# Patient Record
Sex: FEMALE | Race: BLACK OR AFRICAN AMERICAN | NOT HISPANIC OR LATINO | Employment: FULL TIME | URBAN - METROPOLITAN AREA
[De-identification: names, ages, dates, MRNs, and addresses within clinical notes are randomized per-mention and may not be internally consistent; named-entity substitution may affect disease eponyms.]

---

## 2019-09-09 ENCOUNTER — APPOINTMENT (EMERGENCY)
Dept: CT IMAGING | Facility: HOSPITAL | Age: 34
End: 2019-09-09

## 2019-09-09 ENCOUNTER — HOSPITAL ENCOUNTER (EMERGENCY)
Facility: HOSPITAL | Age: 34
Discharge: HOME | End: 2019-09-09
Admitting: EMERGENCY MEDICINE

## 2019-09-09 VITALS
WEIGHT: 156 LBS | BODY MASS INDEX: 31.45 KG/M2 | HEART RATE: 84 BPM | SYSTOLIC BLOOD PRESSURE: 98 MMHG | RESPIRATION RATE: 15 BRPM | OXYGEN SATURATION: 100 % | TEMPERATURE: 98.5 F | DIASTOLIC BLOOD PRESSURE: 61 MMHG | HEIGHT: 59 IN

## 2019-09-09 DIAGNOSIS — G43.909 MIGRAINE: Primary | ICD-10-CM

## 2019-09-09 LAB
ANION GAP SERPL CALCULATED.3IONS-SCNC: 11 MMOL/L (ref 4–13)
BASOPHILS # BLD AUTO: 0.06 THOUSANDS/ΜL (ref 0–0.1)
BASOPHILS NFR BLD AUTO: 1 % (ref 0–1)
BUN SERPL-MCNC: 10 MG/DL (ref 5–25)
CALCIUM SERPL-MCNC: 9 MG/DL (ref 8.3–10.1)
CHLORIDE SERPL-SCNC: 102 MMOL/L (ref 100–108)
CO2 SERPL-SCNC: 24 MMOL/L (ref 21–32)
CREAT SERPL-MCNC: 0.85 MG/DL (ref 0.6–1.3)
EOSINOPHIL # BLD AUTO: 0.12 THOUSAND/ΜL (ref 0–0.61)
EOSINOPHIL NFR BLD AUTO: 2 % (ref 0–6)
ERYTHROCYTE [DISTWIDTH] IN BLOOD BY AUTOMATED COUNT: 14.5 % (ref 11.6–15.1)
GFR SERPL CREATININE-BSD FRML MDRD: 102 ML/MIN/1.73SQ M
GLUCOSE SERPL-MCNC: 112 MG/DL (ref 65–140)
HCT VFR BLD AUTO: 38.3 % (ref 34.8–46.1)
HGB BLD-MCNC: 12.7 G/DL (ref 11.5–15.4)
IMM GRANULOCYTES # BLD AUTO: 0.05 THOUSAND/UL (ref 0–0.2)
IMM GRANULOCYTES NFR BLD AUTO: 1 % (ref 0–2)
LYMPHOCYTES # BLD AUTO: 2.09 THOUSANDS/ΜL (ref 0.6–4.47)
LYMPHOCYTES NFR BLD AUTO: 30 % (ref 14–44)
MCH RBC QN AUTO: 30.6 PG (ref 26.8–34.3)
MCHC RBC AUTO-ENTMCNC: 33.2 G/DL (ref 31.4–37.4)
MCV RBC AUTO: 92 FL (ref 82–98)
MONOCYTES # BLD AUTO: 0.64 THOUSAND/ΜL (ref 0.17–1.22)
MONOCYTES NFR BLD AUTO: 9 % (ref 4–12)
NEUTROPHILS # BLD AUTO: 4.02 THOUSANDS/ΜL (ref 1.85–7.62)
NEUTS SEG NFR BLD AUTO: 58 % (ref 43–75)
NRBC BLD AUTO-RTO: 0 /100 WBCS
PLATELET # BLD AUTO: 344 THOUSANDS/UL (ref 149–390)
PMV BLD AUTO: 8.3 FL (ref 8.9–12.7)
POTASSIUM SERPL-SCNC: 4.2 MMOL/L (ref 3.5–5.3)
RBC # BLD AUTO: 4.15 MILLION/UL (ref 3.81–5.12)
SODIUM SERPL-SCNC: 137 MMOL/L (ref 136–145)
WBC # BLD AUTO: 6.98 THOUSAND/UL (ref 4.31–10.16)

## 2019-09-09 PROCEDURE — 99284 EMERGENCY DEPT VISIT MOD MDM: CPT

## 2019-09-09 PROCEDURE — 96365 THER/PROPH/DIAG IV INF INIT: CPT

## 2019-09-09 PROCEDURE — 70450 CT HEAD/BRAIN W/O DYE: CPT

## 2019-09-09 PROCEDURE — 85025 COMPLETE CBC W/AUTO DIFF WBC: CPT | Performed by: NURSE PRACTITIONER

## 2019-09-09 PROCEDURE — 99284 EMERGENCY DEPT VISIT MOD MDM: CPT | Performed by: NURSE PRACTITIONER

## 2019-09-09 PROCEDURE — 36415 COLL VENOUS BLD VENIPUNCTURE: CPT | Performed by: NURSE PRACTITIONER

## 2019-09-09 PROCEDURE — 96375 TX/PRO/DX INJ NEW DRUG ADDON: CPT

## 2019-09-09 PROCEDURE — 80048 BASIC METABOLIC PNL TOTAL CA: CPT | Performed by: NURSE PRACTITIONER

## 2019-09-09 RX ORDER — METOCLOPRAMIDE HYDROCHLORIDE 5 MG/ML
10 INJECTION INTRAMUSCULAR; INTRAVENOUS ONCE
Status: COMPLETED | OUTPATIENT
Start: 2019-09-09 | End: 2019-09-09

## 2019-09-09 RX ORDER — MEDROXYPROGESTERONE ACETATE 150 MG/ML
150 INJECTION, SUSPENSION INTRAMUSCULAR
COMMUNITY
Start: 2019-07-09

## 2019-09-09 RX ORDER — MAGNESIUM SULFATE HEPTAHYDRATE 40 MG/ML
2 INJECTION, SOLUTION INTRAVENOUS ONCE
Status: COMPLETED | OUTPATIENT
Start: 2019-09-09 | End: 2019-09-09

## 2019-09-09 RX ORDER — KETOROLAC TROMETHAMINE 30 MG/ML
30 INJECTION, SOLUTION INTRAMUSCULAR; INTRAVENOUS ONCE
Status: COMPLETED | OUTPATIENT
Start: 2019-09-09 | End: 2019-09-09

## 2019-09-09 RX ORDER — DIPHENHYDRAMINE HYDROCHLORIDE 50 MG/ML
25 INJECTION INTRAMUSCULAR; INTRAVENOUS ONCE
Status: COMPLETED | OUTPATIENT
Start: 2019-09-09 | End: 2019-09-09

## 2019-09-09 RX ADMIN — KETOROLAC TROMETHAMINE 30 MG: 30 INJECTION, SOLUTION INTRAMUSCULAR at 08:23

## 2019-09-09 RX ADMIN — DIPHENHYDRAMINE HYDROCHLORIDE 25 MG: 50 INJECTION, SOLUTION INTRAMUSCULAR; INTRAVENOUS at 08:21

## 2019-09-09 RX ADMIN — SODIUM CHLORIDE 1000 ML: 0.9 INJECTION, SOLUTION INTRAVENOUS at 08:21

## 2019-09-09 RX ADMIN — METOCLOPRAMIDE 10 MG: 5 INJECTION, SOLUTION INTRAMUSCULAR; INTRAVENOUS at 08:24

## 2019-09-09 RX ADMIN — MAGNESIUM SULFATE HEPTAHYDRATE 2 G: 40 INJECTION, SOLUTION INTRAVENOUS at 08:25

## 2019-09-09 NOTE — ED PROVIDER NOTES
History  Chief Complaint   Patient presents with    Migraine     pt c/o migraines for the past month  states that since  she noticed some loss of vision on her right side     This is a 38-year-old female who presents here with a chief complaint of migraine headache  She reports she has had daily headaches for at least the last month  She reports that occasionally when she gets these headaches she gets some right-sided numbness  She believes that her peripheral vision has been affected  The example that she provided is she can see out of her peripheral vision when someone is approaching her and passes her  But then later she will notice that they are in front of her, and she did not even see them walk by  She reports that she has been having at least daily headaches  Her headaches are typically left-sided and are associated with some photophobia  She denies nausea vomiting  Previously in the past that he had been prescribed Imitrex but had never used it  She was placed on medication for anxiety during that time as well and the headaches seem to resolve on her own  She reports that her headache is currently added level 8  Prior to Admission Medications   Prescriptions Last Dose Informant Patient Reported? Taking? medroxyPROGESTERone (DEPO-PROVERA) 150 mg/mL injection   Yes Yes   Sig: Inject 150 mg into a muscle      Facility-Administered Medications: None       Past Medical History:   Diagnosis Date    Hyperlipidemia        Past Surgical History:   Procedure Laterality Date     SECTION         History reviewed  No pertinent family history  I have reviewed and agree with the history as documented  Social History     Tobacco Use    Smoking status: Never Smoker    Smokeless tobacco: Never Used   Substance Use Topics    Alcohol use: Not Currently    Drug use: Never        Review of Systems   Constitutional: Negative for activity change, fatigue and fever     HENT: Negative for congestion, ear pain, rhinorrhea and sore throat  Eyes: Positive for visual disturbance  Respiratory: Negative for cough, shortness of breath and wheezing  Gastrointestinal: Negative for abdominal pain, diarrhea, nausea and vomiting  Endocrine: Negative  Genitourinary: Negative for difficulty urinating, dyspareunia, dysuria, flank pain, frequency, menstrual problem, pelvic pain, urgency, vaginal bleeding, vaginal discharge and vaginal pain  Musculoskeletal: Negative for arthralgias and myalgias  Skin: Negative for color change and pallor  Neurological: Positive for numbness and headaches  Negative for dizziness, speech difficulty and weakness  Hematological: Negative for adenopathy  Psychiatric/Behavioral: Negative for confusion  Physical Exam  Physical Exam   Constitutional: She is oriented to person, place, and time  She appears well-developed and well-nourished  She is cooperative  Non-toxic appearance  She does not have a sickly appearance  She does not appear ill  No distress  HENT:   Head: Normocephalic and atraumatic  Right Ear: Tympanic membrane and external ear normal    Left Ear: Tympanic membrane and external ear normal    Nose: No rhinorrhea, sinus tenderness or nasal deformity  No epistaxis  Right sinus exhibits no maxillary sinus tenderness and no frontal sinus tenderness  Left sinus exhibits no maxillary sinus tenderness and no frontal sinus tenderness  Mouth/Throat: Oropharynx is clear and moist and mucous membranes are normal  Normal dentition  Eyes: Pupils are equal, round, and reactive to light  EOM are normal    Neck: Normal range of motion  Neck supple  Cardiovascular: Normal rate, regular rhythm and normal heart sounds  No murmur heard  Pulmonary/Chest: Effort normal and breath sounds normal  No accessory muscle usage  No respiratory distress  She has no wheezes  She has no rales  She exhibits no tenderness  Abdominal: Soft   She exhibits no distension  There is no guarding  Musculoskeletal: Normal range of motion  She exhibits no edema or tenderness  Lymphadenopathy:     She has no cervical adenopathy  Neurological: She is alert and oriented to person, place, and time  She exhibits normal muscle tone  Skin: Skin is warm and dry  No rash noted  No erythema  Psychiatric: She has a normal mood and affect  Nursing note and vitals reviewed        Vital Signs  ED Triage Vitals [09/09/19 0749]   Temperature Pulse Respirations Blood Pressure SpO2   98 5 °F (36 9 °C) 88 17 134/92 99 %      Temp Source Heart Rate Source Patient Position - Orthostatic VS BP Location FiO2 (%)   Oral Monitor Sitting Right arm --      Pain Score       8           Vitals:    09/09/19 0749 09/09/19 0854 09/09/19 0944   BP: 134/92 107/63 98/61   Pulse: 88 76 84   Patient Position - Orthostatic VS: Sitting Lying Lying         Visual Acuity  Visual Acuity      Most Recent Value   L Pupil Size (mm)  5   R Pupil Size (mm)  5          ED Medications  Medications   sodium chloride 0 9 % bolus 1,000 mL (0 mL Intravenous Stopped 9/9/19 0951)   diphenhydrAMINE (BENADRYL) injection 25 mg (25 mg Intravenous Given 9/9/19 0821)   metoclopramide (REGLAN) injection 10 mg (10 mg Intravenous Given 9/9/19 0824)   ketorolac (TORADOL) injection 30 mg (30 mg Intravenous Given 9/9/19 0823)   magnesium sulfate 2 g/50 mL IVPB (premix) 2 g (0 g Intravenous Stopped 9/9/19 0951)       Diagnostic Studies  Results Reviewed     Procedure Component Value Units Date/Time    CBC and differential [[id number]]  (Abnormal) Collected:  09/09/19 0820    Lab Status:  Final result Specimen:  Blood from Arm, Right Updated:  09/09/19 0903     WBC 6 98 Thousand/uL      RBC 4 15 Million/uL      Hemoglobin 12 7 g/dL      Hematocrit 38 3 %      MCV 92 fL      MCH 30 6 pg      MCHC 33 2 g/dL      RDW 14 5 %      MPV 8 3 fL      Platelets 181 Thousands/uL      nRBC 0 /100 WBCs      Neutrophils Relative 58 %      Immat GRANS % 1 %      Lymphocytes Relative 30 %      Monocytes Relative 9 %      Eosinophils Relative 2 %      Basophils Relative 1 %      Neutrophils Absolute 4 02 Thousands/µL      Immature Grans Absolute 0 05 Thousand/uL      Lymphocytes Absolute 2 09 Thousands/µL      Monocytes Absolute 0 64 Thousand/µL      Eosinophils Absolute 0 12 Thousand/µL      Basophils Absolute 0 06 Thousands/µL     Basic metabolic panel [[id number]] Collected:  09/09/19 0820    Lab Status:  Final result Specimen:  Blood from Arm, Right Updated:  09/09/19 0099     Sodium 137 mmol/L      Potassium 4 2 mmol/L      Chloride 102 mmol/L      CO2 24 mmol/L      ANION GAP 11 mmol/L      BUN 10 mg/dL      Creatinine 0 85 mg/dL      Glucose 112 mg/dL      Calcium 9 0 mg/dL      eGFR 102 ml/min/1 73sq m     Narrative:       [place] guidelines for Chronic Kidney Disease (CKD):     Stage 1 with normal or high GFR (GFR > 90 mL/min/1 73 square meters)    Stage 2 Mild CKD (GFR = 60-89 mL/min/1 73 square meters)    Stage 3A Moderate CKD (GFR = 45-59 mL/min/1 73 square meters)    Stage 3B Moderate CKD (GFR = 30-44 mL/min/1 73 square meters)    Stage 4 Severe CKD (GFR = 15-29 mL/min/1 73 square meters)    Stage 5 End Stage CKD (GFR <15 mL/min/1 73 square meters)  Note: GFR calculation is accurate only with a steady state creatinine                 CT head without contrast   Final Result by Amy Castillo MD (09/09 0429)      No acute intracranial abnormality                    Workstation performed: [id number]                    Procedures  Procedures       ED Course                               MDM  Number of Diagnoses or Management Options  Migraine: new and requires workup     Amount and/or Complexity of Data Reviewed  Clinical lab tests: reviewed and ordered  Tests in the radiology section of CPT®: reviewed and ordered  Independent visualization of images, tracings, or specimens: yes    Patient Progress  Patient progress: stable      Disposition  Final diagnoses:   Migraine     Time reflects when diagnosis was documented in both MDM as applicable and the Disposition within this note     Time User Action Codes Description Comment    9/9/2019  9:32 AM Art Hugo [[id number]] Migraine       ED Disposition     ED Disposition Condition Date/Time Comment    Discharge Stable Mon Sep 9, 2019  9:32 AM Desi Davenport discharge to home/self care  Follow-up Information     Follow up With Specialties Details Why Contact Info Additional [address] [facility] Schedule an appointment as soon as possible for a visit  For Continued Evaluation [facility] [place] [facility] Schedule an appointment as soon as possible for a visit  For Continued Evaluation [facility] [id number]  [address]  Neurology [address], [address]          There are no discharge medications for this patient          ED Provider  Electronically Signed by           MIRNA Lewis  09/09/19 5503

## 2020-01-16 ENCOUNTER — APPOINTMENT (EMERGENCY)
Dept: RADIOLOGY | Facility: HOSPITAL | Age: 35
End: 2020-01-16
Payer: COMMERCIAL

## 2020-01-16 ENCOUNTER — APPOINTMENT (EMERGENCY)
Dept: CT IMAGING | Facility: HOSPITAL | Age: 35
End: 2020-01-16
Payer: COMMERCIAL

## 2020-01-16 ENCOUNTER — APPOINTMENT (EMERGENCY)
Dept: ULTRASOUND IMAGING | Facility: HOSPITAL | Age: 35
End: 2020-01-16
Payer: COMMERCIAL

## 2020-01-16 ENCOUNTER — HOSPITAL ENCOUNTER (EMERGENCY)
Facility: HOSPITAL | Age: 35
Discharge: HOME | End: 2020-01-16
Attending: EMERGENCY MEDICINE
Payer: COMMERCIAL

## 2020-01-16 VITALS
OXYGEN SATURATION: 98 % | DIASTOLIC BLOOD PRESSURE: 68 MMHG | HEART RATE: 83 BPM | HEIGHT: 59 IN | WEIGHT: 152.56 LBS | BODY MASS INDEX: 30.76 KG/M2 | SYSTOLIC BLOOD PRESSURE: 101 MMHG | RESPIRATION RATE: 20 BRPM | TEMPERATURE: 98.5 F

## 2020-01-16 DIAGNOSIS — R06.02 SHORTNESS OF BREATH ON EXERTION: Primary | ICD-10-CM

## 2020-01-16 LAB
ANION GAP SERPL CALCULATED.3IONS-SCNC: 11 MMOL/L (ref 4–13)
APTT PPP: 32 SECONDS (ref 23–37)
ATRIAL RATE: 86 BPM
BASOPHILS # BLD AUTO: 0.03 THOUSANDS/ΜL (ref 0–0.1)
BASOPHILS NFR BLD AUTO: 1 % (ref 0–1)
BUN SERPL-MCNC: 11 MG/DL (ref 5–25)
CALCIUM SERPL-MCNC: 8.3 MG/DL (ref 8.3–10.1)
CHLORIDE SERPL-SCNC: 104 MMOL/L (ref 100–108)
CO2 SERPL-SCNC: 24 MMOL/L (ref 21–32)
CREAT SERPL-MCNC: 0.84 MG/DL (ref 0.6–1.3)
D DIMER PPP FEU-MCNC: 0.66 UG/ML FEU
EOSINOPHIL # BLD AUTO: 0.03 THOUSAND/ΜL (ref 0–0.61)
EOSINOPHIL NFR BLD AUTO: 1 % (ref 0–6)
ERYTHROCYTE [DISTWIDTH] IN BLOOD BY AUTOMATED COUNT: 13.9 % (ref 11.6–15.1)
GFR SERPL CREATININE-BSD FRML MDRD: 103 ML/MIN/1.73SQ M
GLUCOSE SERPL-MCNC: 104 MG/DL (ref 65–140)
HCT VFR BLD AUTO: 40.1 % (ref 34.8–46.1)
HGB BLD-MCNC: 13.1 G/DL (ref 11.5–15.4)
IMM GRANULOCYTES # BLD AUTO: 0.02 THOUSAND/UL (ref 0–0.2)
IMM GRANULOCYTES NFR BLD AUTO: 0 % (ref 0–2)
INR PPP: 1.16 (ref 0.84–1.19)
LYMPHOCYTES # BLD AUTO: 0.99 THOUSANDS/ΜL (ref 0.6–4.47)
LYMPHOCYTES NFR BLD AUTO: 22 % (ref 14–44)
MCH RBC QN AUTO: 30.3 PG (ref 26.8–34.3)
MCHC RBC AUTO-ENTMCNC: 32.7 G/DL (ref 31.4–37.4)
MCV RBC AUTO: 93 FL (ref 82–98)
MONOCYTES # BLD AUTO: 0.51 THOUSAND/ΜL (ref 0.17–1.22)
MONOCYTES NFR BLD AUTO: 11 % (ref 4–12)
NEUTROPHILS # BLD AUTO: 3.03 THOUSANDS/ΜL (ref 1.85–7.62)
NEUTS SEG NFR BLD AUTO: 65 % (ref 43–75)
NRBC BLD AUTO-RTO: 0 /100 WBCS
NT-PROBNP SERPL-MCNC: 8 PG/ML
P AXIS: 63 DEGREES
PLATELET # BLD AUTO: 333 THOUSANDS/UL (ref 149–390)
PMV BLD AUTO: 8.3 FL (ref 8.9–12.7)
POTASSIUM SERPL-SCNC: 3.4 MMOL/L (ref 3.5–5.3)
PR INTERVAL: 128 MS
PROTHROMBIN TIME: 14.9 SECONDS (ref 11.6–14.5)
QRS AXIS: 87 DEGREES
QRSD INTERVAL: 76 MS
QT INTERVAL: 368 MS
QTC INTERVAL: 440 MS
RBC # BLD AUTO: 4.32 MILLION/UL (ref 3.81–5.12)
SODIUM SERPL-SCNC: 139 MMOL/L (ref 136–145)
T WAVE AXIS: 38 DEGREES
TROPONIN I SERPL-MCNC: <0.02 NG/ML
VENTRICULAR RATE: 86 BPM
WBC # BLD AUTO: 4.61 THOUSAND/UL (ref 4.31–10.16)

## 2020-01-16 PROCEDURE — 99285 EMERGENCY DEPT VISIT HI MDM: CPT

## 2020-01-16 PROCEDURE — 85025 COMPLETE CBC W/AUTO DIFF WBC: CPT | Performed by: EMERGENCY MEDICINE

## 2020-01-16 PROCEDURE — 85379 FIBRIN DEGRADATION QUANT: CPT | Performed by: EMERGENCY MEDICINE

## 2020-01-16 PROCEDURE — 84484 ASSAY OF TROPONIN QUANT: CPT | Performed by: EMERGENCY MEDICINE

## 2020-01-16 PROCEDURE — 93005 ELECTROCARDIOGRAM TRACING: CPT

## 2020-01-16 PROCEDURE — 99285 EMERGENCY DEPT VISIT HI MDM: CPT | Performed by: EMERGENCY MEDICINE

## 2020-01-16 PROCEDURE — 93970 EXTREMITY STUDY: CPT

## 2020-01-16 PROCEDURE — 71275 CT ANGIOGRAPHY CHEST: CPT

## 2020-01-16 PROCEDURE — 83880 ASSAY OF NATRIURETIC PEPTIDE: CPT | Performed by: EMERGENCY MEDICINE

## 2020-01-16 PROCEDURE — 71046 X-RAY EXAM CHEST 2 VIEWS: CPT

## 2020-01-16 PROCEDURE — 93010 ELECTROCARDIOGRAM REPORT: CPT | Performed by: INTERNAL MEDICINE

## 2020-01-16 PROCEDURE — 80048 BASIC METABOLIC PNL TOTAL CA: CPT | Performed by: EMERGENCY MEDICINE

## 2020-01-16 PROCEDURE — 85730 THROMBOPLASTIN TIME PARTIAL: CPT | Performed by: EMERGENCY MEDICINE

## 2020-01-16 PROCEDURE — 36415 COLL VENOUS BLD VENIPUNCTURE: CPT | Performed by: EMERGENCY MEDICINE

## 2020-01-16 PROCEDURE — 85610 PROTHROMBIN TIME: CPT | Performed by: EMERGENCY MEDICINE

## 2020-01-16 PROCEDURE — 93970 EXTREMITY STUDY: CPT | Performed by: SURGERY

## 2020-01-16 RX ADMIN — IOHEXOL 85 ML: 350 INJECTION, SOLUTION INTRAVENOUS at 09:37

## 2020-01-16 NOTE — ED NOTES
D/c reviewed with pt  Pt verbalized understanding and has no further questions at this time        Waylon Villafana, RN  01/16/20 3178

## 2020-01-16 NOTE — ED PROVIDER NOTES
History  Chief Complaint   Patient presents with    Shortness of Breath     Patient c/o SOB that started yesterday and gets worse with any kind of exertion  Patient states she saw her primary care doctor yesterday and had an EKG and chest x-ray which resulted normal   Patient is also c/o bilateral calf  pain  Patient states recent surgery and just flew home from [address] [place] [address] this past Sunday  25-year-old female presents with chief complaint of shortness of breath , noticed this progressing over the past 2 days  , not sudden onset but rather gradual over the past 2 days  , occasional vague chest pain, but chest pain is not her main complaint  It is more shortness of breath  , has had bilateral extremity achy discomfort/pain  No modifying or relieving factors  Has not use any over-the-counter remedies  Patient went to her PCP yesterday, had and EKG which was unremarkable, told go for an outpatient CTA of the chest to evaluate for pulmonary embolism, patient was unable to have this performed as an outpatient, worsening of shortness of breath, prompted her to come here  Patient does not have a personal history of Pulmonary embolism, but states her mother and her sister have both had pulmonary embolisms  Patient has had recent travel, patient has had recent leg pain, no leg swelling, patient does not take any exogenous estrogen  History provided by:  Patient  Shortness of Breath   Severity:  Moderate  Onset quality:  Gradual  Duration:  2 days  Progression:  Worsening  Chronicity:  New  Context: activity    Relieved by:  Rest  Worsened by:   Activity  Ineffective treatments:  None tried  Associated symptoms: chest pain    Associated symptoms: no abdominal pain, no cough, no diaphoresis, no fever, no headaches, no neck pain, no rash, no sore throat, no vomiting and no wheezing    Associated symptoms comment:  Bilateral lower extremity pain      Prior to Admission Medications   Prescriptions Last Dose Informant Patient Reported? Taking? medroxyPROGESTERone (DEPO-PROVERA) 150 mg/mL injection   Yes No   Sig: Inject 150 mg into a muscle      Facility-Administered Medications: None       Past Medical History:   Diagnosis Date    Hyperlipidemia        Past Surgical History:   Procedure Laterality Date     SECTION         History reviewed  No pertinent family history  I have reviewed and agree with the history as documented  Social History     Tobacco Use    Smoking status: Never Smoker    Smokeless tobacco: Never Used   Substance Use Topics    Alcohol use: Not Currently    Drug use: Never        Review of Systems   Constitutional: Negative for activity change, chills, diaphoresis and fever  HENT: Negative for congestion, sinus pressure and sore throat  Eyes: Negative for pain and visual disturbance  Respiratory: Positive for shortness of breath  Negative for cough, chest tightness, wheezing and stridor  Cardiovascular: Positive for chest pain  Negative for palpitations  Gastrointestinal: Negative for abdominal distention, abdominal pain, constipation, diarrhea, nausea and vomiting  Genitourinary: Negative for dysuria and frequency  Musculoskeletal: Negative for neck pain and neck stiffness  Skin: Negative for rash  Neurological: Negative for dizziness, speech difficulty, light-headedness, numbness and headaches  Physical Exam  Physical Exam   Constitutional: She is oriented to person, place, and time  She appears well-developed  No distress  HENT:   Head: Normocephalic and atraumatic  Eyes: Pupils are equal, round, and reactive to light  Neck: Normal range of motion  Neck supple  No tracheal deviation present  Cardiovascular: Normal rate, regular rhythm, normal heart sounds and intact distal pulses  No murmur heard  Pulmonary/Chest: Effort normal and breath sounds normal  No stridor  No respiratory distress  Lungs clear to auscultation bilaterally   Abdominal: Soft   She exhibits no distension  There is no tenderness  There is no rebound and no guarding  Musculoskeletal: Normal range of motion  Right lower leg: She exhibits no edema  Left lower leg: She exhibits no edema  Neurological: She is alert and oriented to person, place, and time  Skin: Skin is warm and dry  She is not diaphoretic  No erythema  No pallor  Psychiatric: She has a normal mood and affect  Vitals reviewed        Vital Signs  ED Triage Vitals [01/16/20 0815]   Temperature Pulse Respirations Blood Pressure SpO2   98 5 °F (36 9 °C) 83 20 122/77 99 %      Temp Source Heart Rate Source Patient Position - Orthostatic VS BP Location FiO2 (%)   Oral Monitor Sitting Right arm --      Pain Score       --           Vitals:    01/16/20 0815 01/16/20 0900   BP: 122/77 106/73   Pulse: 83 85   Patient Position - Orthostatic VS: Sitting Lying         Visual Acuity      ED Medications  Medications   iohexol (OMNIPAQUE) 350 MG/ML injection (MULTI-DOSE) 85 mL (85 mL Intravenous Given 1/16/20 0937)       Diagnostic Studies  Results Reviewed     Procedure Component Value Units Date/Time    Basic metabolic panel [[id number]]  (Abnormal) Collected:  01/16/20 0825    Lab Status:  Final result Specimen:  Blood from Arm, Right Updated:  01/16/20 0903     Sodium 139 mmol/L      Potassium 3 4 mmol/L      Chloride 104 mmol/L      CO2 24 mmol/L      ANION GAP 11 mmol/L      BUN 11 mg/dL      Creatinine 0 84 mg/dL      Glucose 104 mg/dL      Calcium 8 3 mg/dL      eGFR 103 ml/min/1 73sq m     Narrative:       [place] guidelines for Chronic Kidney Disease (CKD):     Stage 1 with normal or high GFR (GFR > 90 mL/min/1 73 square meters)    Stage 2 Mild CKD (GFR = 60-89 mL/min/1 73 square meters)    Stage 3A Moderate CKD (GFR = 45-59 mL/min/1 73 square meters)    Stage 3B Moderate CKD (GFR = 30-44 mL/min/1 73 square meters)    Stage 4 Severe CKD (GFR = 15-29 mL/min/1 73 square meters)    Stage 5 End Stage CKD (GFR <15 mL/min/1 73 square meters)  Note: GFR calculation is accurate only with a steady state creatinine    NT-BNP PRO [[id number]]  (Normal) Collected:  01/16/20 0825    Lab Status:  Final result Specimen:  Blood from Arm, Right Updated:  01/16/20 0903     NT-proBNP 8 pg/mL     Troponin I [[id number]]  (Normal) Collected:  01/16/20 0832    Lab Status:  Final result Specimen:  Blood from Arm, Right Updated:  01/16/20 0858     Troponin I <0 02 ng/mL     D-Dimer [[id number]]  (Abnormal) Collected:  01/16/20 0825    Lab Status:  Final result Specimen:  Blood from Arm, Right Updated:  01/16/20 0852     D-Dimer, Quant 0 66 ug/ml FEU     Protime-INR [[id number]]  (Abnormal) Collected:  01/16/20 0825    Lab Status:  Final result Specimen:  Blood from Arm, Right Updated:  01/16/20 0849     Protime 14 9 seconds      INR 1 16    APTT [[id number]]  (Normal) Collected:  01/16/20 0825    Lab Status:  Final result Specimen:  Blood from Arm, Right Updated:  01/16/20 0849     PTT 32 seconds     CBC and differential [[id number]]  (Abnormal) Collected:  01/16/20 0825    Lab Status:  Final result Specimen:  Blood from Arm, Right Updated:  01/16/20 0836     WBC 4 61 Thousand/uL      RBC 4 32 Million/uL      Hemoglobin 13 1 g/dL      Hematocrit 40 1 %      MCV 93 fL      MCH 30 3 pg      MCHC 32 7 g/dL      RDW 13 9 %      MPV 8 3 fL      Platelets 899 Thousands/uL      nRBC 0 /100 WBCs      Neutrophils Relative 65 %      Immat GRANS % 0 %      Lymphocytes Relative 22 %      Monocytes Relative 11 %      Eosinophils Relative 1 %      Basophils Relative 1 %      Neutrophils Absolute 3 03 Thousands/µL      Immature Grans Absolute 0 02 Thousand/uL      Lymphocytes Absolute 0 99 Thousands/µL      Monocytes Absolute 0 51 Thousand/µL      Eosinophils Absolute 0 03 Thousand/µL      Basophils Absolute 0 03 Thousands/µL                  CTA ED chest PE study   Final Result by Krissy Dia MD (01/16 1017)      Normal with no pulmonary embolus  Workstation performed: [id number]         XR chest 2 views   ED Interpretation by Tamika Toussaint DO (01/16 7539)   Chest x-ray without any acute pathology      Final Result by Bethene Lesches, MD (01/16 7220)      No acute cardiopulmonary disease              Workstation performed: [id number]         VAS lower limb venous duplex study, complete bilateral    (Results Pending)              Procedures  ECG 12 Lead Documentation Only  Date/Time: 1/16/2020 8:25 AM  Performed by: Tamika Toussaint DO  Authorized by: Tamika Toussaint DO     ECG reviewed by me, the ED Provider: yes    Patient location:  ED  Interpretation:     Interpretation: normal    Rate:     ECG rate:  86    ECG rate assessment: normal    Rhythm:     Rhythm: sinus rhythm    Ectopy:     Ectopy: none    QRS:     QRS axis:  Normal    QRS intervals:  Normal  Conduction:     Conduction: normal    ST segments:     ST segments:  Normal  T waves:     T waves: normal               ED Course  ED Course as of Jan 16 1048   Thu Jan 16, 2020   0923 Elevated D-dimer, normal chest x-ray will proceed to CTA chest                  PERC Rule for PE      Most Recent Value   PERC Rule for PE   Age >=50  0 Filed at: 01/16/2020 0822   HR >=100  0 Filed at: 01/16/2020 0822   O2 Sat on room air < 95%  0 Filed at: 01/16/2020 1495   History of PE or DVT  0 Filed at: 01/16/2020 2267   Recent trauma or surgery  0 Filed at: 01/16/2020 5194   Hemoptysis  0 Filed at: 01/16/2020 0200   Exogenous estrogen  0 Filed at: 01/16/2020 8265   Unilateral leg swelling  0 Filed at: 01/16/2020 8751   PERC Rule for PE Results  0 Filed at: 01/16/2020 7211                Wells' Criteria for PE      Most Recent Value   Wells' Criteria for PE   Clinical signs and symptoms of DVT  0 Filed at: 01/16/2020 6804   PE is primary diagnosis or equally likely  3 Filed at: 01/16/2020 0822   HR >100  0 Filed at: 01/16/2020 4001   Immobilization at least 3 days or Surgery in the previous 4 weeks  0 Filed at: 01/16/2020 2663   Previous, objectively diagnosed PE or DVT  0 Filed at: 01/16/2020 4445   Hemoptysis  0 Filed at: 01/16/2020 2302   Malignancy with treatment within 6 months or palliative  0 Filed at: 01/16/2020 5536   Wells' Criteria Total  3 Filed at: 01/16/2020 8078            [facility]  Number of Diagnoses or Management Options  Shortness of breath on exertion: new and requires workup  Diagnosis management comments:       Initial ED assessment:  41-year-old female, shortness of breath, worse with exertion, started 2 days ago no previous history of this, family history of pulmonary embolism but not personal , saw her PCP yesterday wrote for an outpatient CTA, patient was unable to complete this and worsening shortness of breath prompting ED visit  Initial DDx includes but is not limited to:   Pulmonary embolism, pneumothorax, deconditioning, infectious etiology such as myocarditis, pneumonia, felt to be less likely  Initial ED plan: Will start with a D-dimer and lower extremity ultrasound  In anticipation of performing CTA will place IV, will check BNP and troponin to evaluate for cardiac strain/injury        Final ED summary/disposition:   After evaluation and workup in the emergency department, CTA negative for Pulmonary embolism  , patient discharged unclear etiology of her symptoms possibly deconditioning patient follow up PCP        Amount and/or Complexity of Data Reviewed  Clinical lab tests: ordered and reviewed  Tests in the radiology section of CPT®: ordered and reviewed  Review and summarize past medical records: yes  Independent visualization of images, tracings, or specimens: yes          Disposition  Final diagnoses:   Shortness of breath on exertion     Time reflects when diagnosis was documented in both MDM as applicable and the Disposition within this note     Time User Action Codes Description Comment    1/16/2020 10:48 AM Shine Sheriff Add [R06 02] Shortness of breath on exertion       ED Disposition     ED Disposition Condition Date/Time Comment    Discharge Stable Thu Jan 16, 2020 10:48 AM Yane Orona discharge to home/self care  Follow-up Information    None         Patient's Medications   Discharge Prescriptions    No medications on file     No discharge procedures on file      ED Provider  Electronically Signed by           Malena Nash DO  01/16/20 1045

## 2020-11-23 ENCOUNTER — TRANSCRIBE ORDERS (OUTPATIENT)
Dept: URGENT CARE | Facility: MEDICAL CENTER | Age: 35
End: 2020-11-23

## 2020-11-23 ENCOUNTER — APPOINTMENT (OUTPATIENT)
Dept: RADIOLOGY | Facility: MEDICAL CENTER | Age: 35
End: 2020-11-23

## 2020-11-23 DIAGNOSIS — Z02.1 PHYSICAL EXAM, PRE-EMPLOYMENT: ICD-10-CM

## 2020-11-23 DIAGNOSIS — Z02.1 PHYSICAL EXAM, PRE-EMPLOYMENT: Primary | ICD-10-CM

## 2020-11-23 PROCEDURE — 71045 X-RAY EXAM CHEST 1 VIEW: CPT

## 2024-04-18 ENCOUNTER — OFFICE VISIT (OUTPATIENT)
Dept: BARIATRICS | Facility: CLINIC | Age: 39
End: 2024-04-18
Payer: COMMERCIAL

## 2024-04-18 VITALS
WEIGHT: 170.4 LBS | HEIGHT: 60 IN | SYSTOLIC BLOOD PRESSURE: 120 MMHG | DIASTOLIC BLOOD PRESSURE: 70 MMHG | HEART RATE: 95 BPM | BODY MASS INDEX: 33.45 KG/M2 | RESPIRATION RATE: 16 BRPM | TEMPERATURE: 97.9 F

## 2024-04-18 DIAGNOSIS — E66.9 OBESITY, CLASS I, BMI 30-34.9: Primary | ICD-10-CM

## 2024-04-18 DIAGNOSIS — Z51.81 THERAPEUTIC DRUG MONITORING: ICD-10-CM

## 2024-04-18 DIAGNOSIS — R73.03 PREDIABETES: ICD-10-CM

## 2024-04-18 PROBLEM — E66.811 OBESITY, CLASS I, BMI 30-34.9: Status: ACTIVE | Noted: 2024-04-18

## 2024-04-18 PROCEDURE — 99204 OFFICE O/P NEW MOD 45 MIN: CPT | Performed by: INTERNAL MEDICINE

## 2024-04-18 RX ORDER — METFORMIN HYDROCHLORIDE 750 MG/1
TABLET, EXTENDED RELEASE ORAL
Qty: 60 TABLET | Refills: 1 | Status: SHIPPED | OUTPATIENT
Start: 2024-04-18 | End: 2024-04-24

## 2024-04-18 RX ORDER — MEDROXYPROGESTERONE ACETATE 150 MG/ML
INJECTION, SUSPENSION INTRAMUSCULAR
COMMUNITY
Start: 2024-03-07

## 2024-04-18 NOTE — PATIENT INSTRUCTIONS
General Recommendations:  Nutrition:  Eat breakfast daily.  Do not skip meals.     Food log (ie.) www.[employer].com, sparkpeople.com, loseit.com, calorieking.com, etc.    Practice mindful eating.  Be sure to set aside time to eat, eat slowly, and savor your food.    Hydration:    At least 64oz of water daily.  No sugar sweetened beverages.  No juice (eat the fruit instead).    Exercise:  Studies have shown that the ideal exercise goal is somewhere between 150 to 300 minutes of moderate intensity exercise a week.  Start with exercising 10 minutes every other day and gradually increase physical activity with a goal of at least 150 minutes of moderate intensity exercise a week, divided over at least 3 days a week.  An example of this would be exercising 30 minutes a day, 5 days a week.  Resistance training can increase muscle mass and increase our resting metabolic rate.   FULL BODY resistance training is recommended 2-3 times a week.  Do not do this on consecutive days to allow for muscle recovery.    Aim for a bare minimum 5000 steps, even on days you do not exercise.    Monitoring:   Weigh yourself daily.  If this causes undue stress, then just weigh yourself once a week.  Weigh yourself the same time of the day with the same amount of clothing on.  Preferably this should be done after waking up, before you eat, and with no clothing or minimal clothing on.    Specific Goals:  Have EKG done.    Take metformin as directed.    May continue with phentermine 1/2 tablet daily.  Potential side effects of Phentermine: increased heart rate, increased blood pressure, palpitations, headache, dizziness, insomnia, altered mood, abdominal upset, and dry mouth. Notify the provider with change in mood. Please go to the emergency room if you develop thoughts of harming yourself or others. Phentermine should be stopped 2 weeks prior to surgery. Notify the provider with any changes in vision.     After starting or increasing dose  of phentermine it is advised that you check your resting blood pressure and pulse at least 3 times per week for a month.  Vary the time of day and record these results.  If you have any resting blood pressures above 140/90 or heart rate above 100 beats per minute, then you must notify your weight loss specialist and primary care doctor.      Calorie goal:  9926-2238 calories a day (Provided with meal plan to follow).  May continue with Symphogen Candelaria that you have been using.    Nurse in 6-8 weeks.  Return visit:  3 months

## 2024-04-18 NOTE — PROGRESS NOTES
Assessment/Plan:  Deidra was seen today for consult.    Diagnoses and all orders for this visit:    Obesity, Class I, BMI 30-34.9  -     metFORMIN (GLUCOPHAGE-XR) 750 mg 24 hr tablet; Take one tablet by mouth daily for 14 days, then take 2 tablets by mouth daily.  Take with food.  -     ECG 12 lead; Future    Prediabetes  -     metFORMIN (GLUCOPHAGE-XR) 750 mg 24 hr tablet; Take one tablet by mouth daily for 14 days, then take 2 tablets by mouth daily.  Take with food.    Therapeutic drug monitoring  -     ECG 12 lead; Future       - Discussed options of HealthyCORE-Intensive Lifestyle Intervention Program, Very Low Calorie Diet-VLCD, and Conservative Program and the role of weight loss medications.  - Explained the importance of making lifestyle changes.  - Patient is interested in pursuing Conservative Program.  - Initial weight loss goal of 5-10% weight loss for improved health as studies have shown this is where we see the greatest impact on improving health and decreasing risk of obesity related conditions.  - Weight loss can improve patient's co-morbid conditions and/or prevent weight-related complications.  - Labs reviewed: As below.    -Patient was initially not forthcoming with the fact that she received phentermine through a mail order/online program.  I discussed with her that she needs to be 100% honest with me as we both share the common goal of aiming to improve her health through lifestyle changes and weight loss with the aid of medication if needed.  She has already lost 7 pounds in 2 months of being on phentermine.  She has been taking 1/2 tablet a day which is consistent with the PDMP report.  She would like to remain on this medication and signed a medication agreement which was scanned into the her file.  We discussed the addition of topiramate versus metformin in addition to this.  We discussed the risk and benefit of each.  Even her history of prediabetes or recommend starting metformin at  this time.  We discussed how to take the medication and titrated up.  She is to have a EKG done prior to her phentermine being refilled by the office.    General Recommendations:  Nutrition:  Eat breakfast daily.  Do not skip meals.     Food log (ie.) www.[employer].com, sparkpeople.com, loseit.com, [employer].com, etc.    Practice mindful eating.  Be sure to set aside time to eat, eat slowly, and savor your food.    Hydration:    At least 64oz of water daily.  No sugar sweetened beverages.  No juice (eat the fruit instead).    Exercise:  Studies have shown that the ideal exercise goal is somewhere between 150 to 300 minutes of moderate intensity exercise a week.  Start with exercising 10 minutes every other day and gradually increase physical activity with a goal of at least 150 minutes of moderate intensity exercise a week, divided over at least 3 days a week.  An example of this would be exercising 30 minutes a day, 5 days a week.  Resistance training can increase muscle mass and increase our resting metabolic rate.   FULL BODY resistance training is recommended 2-3 times a week.  Do not do this on consecutive days to allow for muscle recovery.    Aim for a bare minimum 5000 steps, even on days you do not exercise.    Monitoring:   Weigh yourself daily.  If this causes undue stress, then just weigh yourself once a week.  Weigh yourself the same time of the day with the same amount of clothing on.  Preferably this should be done after waking up, before you eat, and with no clothing or minimal clothing on.    Specific Goals:  Have EKG done.    Take metformin as directed.    May continue with phentermine 1/2 tablet daily.  Potential side effects of Phentermine: increased heart rate, increased blood pressure, palpitations, headache, dizziness, insomnia, altered mood, abdominal upset, and dry mouth. Notify the provider with change in mood. Please go to the emergency room if you develop thoughts of harming yourself  "or others. Phentermine should be stopped 2 weeks prior to surgery. Notify the provider with any changes in vision.     After starting or increasing dose of phentermine it is advised that you check your resting blood pressure and pulse at least 3 times per week for a month.  Vary the time of day and record these results.  If you have any resting blood pressures above 140/90 or heart rate above 100 beats per minute, then you must notify your weight loss specialist and primary care doctor.    Calorie goal:  4330-7029 calories a day (Provided with meal plan to follow).  May continue with [employer] that you have been using.    Nurse in 6-8 weeks.  Return visit:  3 months     Total time spent reviewing chart, interviewing patient, examining patient, discussing plan, answering all questions, and documentin min.       ______________________________________________________________________      Subjective:   Chief Complaint   Patient presents with    Consult     MWM- Consult, GW 145lb- Stop Bang        HPI: Deidra Marshall  is a 38 y.o. female with history of prediabetes and excess weight, here to pursue weight loss management.  Previous notes and records have been reviewed.    HPI  Wt Readings from Last 20 Encounters:   24 77.3 kg (170 lb 6.4 oz)   20 69.2 kg (152 lb 8.9 oz)   19 70.8 kg (156 lb)     Excess Weight:    Onset:  age 23    Highest weight: 177  Lowest Weight: 120 lbs up until age 23  Current weight: 170  What has been tried: Diet and Exercise  Contributing factors: impulsivity with foods, intense cravings. \"I love rice\"      Hunger/Cravings: +CRAVINGS for sweets - impulsive eating  Dining out:  more than I cook at home.  Hydration:  Water 48-64oz, Coffee with splenda and 2 H&H   Alcohol:  <1 a week  Smoking:  No  Exercise:  3 days a week  Weight Monitoring:  Weekly  Sleep:  7 hours  STOP-BANG Score:   Occupation:  RN    Tubal ligation.  Depo shots for 12 years.  She reports doing " "this more for mood.  \"Antidepressants make me feel worse.\"  Celexa, zoloft, wellbutrin.    Phentermine helped her lose a lot of weight in 2017.  Phentermine 37.5 mg rx from online pharmacy.        Past Medical History:   Diagnosis Date    Hyperlipidemia      Patient denies personal and family history of  pancreatitis, thyroid cancer, MEN-2 tumors.  Denies any hx of glaucoma, seizures, kidney stones, gallstones.  Denies Hx of CAD, PAD, palpitations, arrhythmia.   Denies uncontrolled anxiety or depression, suicidal behavior or thinking , insomnia or sleep disturbance.   Past Surgical History:   Procedure Laterality Date     SECTION       The following portions of the patient's history were reviewed and updated as appropriate: allergies, current medications, past family history, past medical history, past social history, past surgical history, and problem list.    Review Of Systems:  Review of Systems   Constitutional:  Negative for activity change, appetite change, fatigue and fever.   Respiratory:  Negative for cough and shortness of breath.    Cardiovascular:  Negative for chest pain, palpitations and leg swelling.   Gastrointestinal:  Negative for abdominal pain, constipation, diarrhea, nausea and vomiting.   Endocrine: Negative for cold intolerance and heat intolerance.   Genitourinary:  Negative for difficulty urinating and dysuria.   Musculoskeletal:  Negative for arthralgias, back pain and gait problem.   Skin:  Negative for pallor and rash.   Neurological:  Negative for headaches.   Psychiatric/Behavioral:  Negative for dysphoric mood, sleep disturbance and suicidal ideas (or HI). The patient is not nervous/anxious.        Objective:  /70   Pulse 95   Temp 97.9 °F (36.6 °C)   Resp 16   Ht 5' (1.524 m)   Wt 77.3 kg (170 lb 6.4 oz)   BMI 33.28 kg/m²   Physical Exam  Vitals and nursing note reviewed.   Constitutional:       General: She is not in acute distress.     Appearance: Normal " appearance. She is not ill-appearing or diaphoretic.   Eyes:      General: No scleral icterus.  Cardiovascular:      Rate and Rhythm: Normal rate and regular rhythm.      Pulses: Normal pulses.      Heart sounds: No murmur heard.  Pulmonary:      Effort: Pulmonary effort is normal. No respiratory distress.      Breath sounds: Normal breath sounds. No wheezing or rhonchi.   Abdominal:      General: Bowel sounds are normal. There is no distension.      Palpations: Abdomen is soft. There is no mass.      Tenderness: There is no abdominal tenderness.   Musculoskeletal:      Cervical back: Neck supple.      Right lower leg: No edema.      Left lower leg: No edema.   Lymphadenopathy:      Cervical: No cervical adenopathy.   Skin:     Capillary Refill: Capillary refill takes less than 2 seconds.      Findings: No lesion or rash.   Neurological:      Mental Status: She is alert and oriented to person, place, and time.      Gait: Gait normal.   Psychiatric:         Mood and Affect: Mood normal.         Behavior: Behavior normal.         Labs and Imaging  Recent labs and imaging have been personally reviewed.  Lab Results   Component Value Date    WBC 4.61 01/16/2020    HGB 13.1 01/16/2020    HCT 40.1 01/16/2020    MCV 93 01/16/2020     01/16/2020     Lab Results   Component Value Date    SODIUM 142 02/23/2023    K 4.4 02/23/2023     02/23/2023    CO2 23 02/23/2023    AGAP 11 02/23/2023    BUN 11 02/23/2023    CREATININE 0.87 02/23/2023    GLUC 81 02/23/2023    CALCIUM 9.3 02/23/2023    AST 18 02/23/2023    ALT 27 02/23/2023    ALKPHOS 53 02/23/2023    TP 7.1 02/23/2023    TBILI 0.8 02/23/2023    EGFR 87 02/23/2023     Lab Results   Component Value Date    HGBA1C 6.0 (H) 03/02/2023     Lab Results   Component Value Date    TSH 1.82 09/29/2021     HgA1c from 2021 was 6.2%.

## 2024-04-19 ENCOUNTER — TELEPHONE (OUTPATIENT)
Age: 39
End: 2024-04-19

## 2024-04-19 NOTE — TELEPHONE ENCOUNTER
Pt needs to reschedule her nurse appt.  I transferred her to [facility] in the office

## 2024-04-24 DIAGNOSIS — E66.9 OBESITY, CLASS I, BMI 30-34.9: Primary | ICD-10-CM

## 2024-04-24 DIAGNOSIS — R73.03 PREDIABETES: ICD-10-CM

## 2024-04-24 RX ORDER — METFORMIN HYDROCHLORIDE 500 MG/1
500 TABLET, EXTENDED RELEASE ORAL
Qty: 14 TABLET | Refills: 0 | Status: SHIPPED | OUTPATIENT
Start: 2024-04-24

## 2024-05-14 ENCOUNTER — OFFICE VISIT (OUTPATIENT)
Dept: LAB | Facility: HOSPITAL | Age: 39
End: 2024-05-14
Payer: COMMERCIAL

## 2024-05-14 DIAGNOSIS — E66.9 OBESITY, CLASS I, BMI 30-34.9: ICD-10-CM

## 2024-05-14 DIAGNOSIS — Z51.81 THERAPEUTIC DRUG MONITORING: ICD-10-CM

## 2024-05-14 LAB
ATRIAL RATE: 91 BPM
P AXIS: 67 DEGREES
PR INTERVAL: 126 MS
QRS AXIS: 70 DEGREES
QRSD INTERVAL: 76 MS
QT INTERVAL: 342 MS
QTC INTERVAL: 420 MS
T WAVE AXIS: 27 DEGREES
VENTRICULAR RATE: 91 BPM

## 2024-05-14 PROCEDURE — 93005 ELECTROCARDIOGRAM TRACING: CPT

## 2024-05-14 PROCEDURE — 93010 ELECTROCARDIOGRAM REPORT: CPT | Performed by: INTERNAL MEDICINE

## 2024-06-04 DIAGNOSIS — E66.9 OBESITY, CLASS I, BMI 30-34.9: Primary | ICD-10-CM

## 2024-06-04 RX ORDER — PHENTERMINE HYDROCHLORIDE 37.5 MG/1
37.5 TABLET ORAL DAILY
Qty: 30 TABLET | Refills: 0 | Status: SHIPPED | OUTPATIENT
Start: 2024-06-12

## 2024-06-11 ENCOUNTER — CLINICAL SUPPORT (OUTPATIENT)
Dept: BARIATRICS | Facility: CLINIC | Age: 39
End: 2024-06-11

## 2024-06-11 VITALS
TEMPERATURE: 97.5 F | DIASTOLIC BLOOD PRESSURE: 70 MMHG | WEIGHT: 166.4 LBS | HEART RATE: 81 BPM | SYSTOLIC BLOOD PRESSURE: 116 MMHG | HEIGHT: 60 IN | BODY MASS INDEX: 32.67 KG/M2

## 2024-06-11 DIAGNOSIS — R73.03 PREDIABETES: Primary | ICD-10-CM

## 2024-06-11 DIAGNOSIS — R63.5 ABNORMAL WEIGHT GAIN: Primary | ICD-10-CM

## 2024-06-11 PROCEDURE — RECHECK

## 2024-06-11 RX ORDER — METFORMIN HYDROCHLORIDE 750 MG/1
750 TABLET, EXTENDED RELEASE ORAL
Qty: 30 TABLET | Refills: 1 | Status: SHIPPED | OUTPATIENT
Start: 2024-06-11

## 2024-06-11 NOTE — PROGRESS NOTES
Patient last visit weight:  170lb  Patient current visit weight:  166.4 lb    If you are taking phentermine or other oral weight loss medications, are you experiencing any of the following symptoms: no  Headache: no  Blurred Vision: no  Chest Pain: no  Palpitations:no  Insomnia: no  SPECIFY ORAL MEDICATION AND DOSAGE:  Pt taking   MetFormin  750 mg and  Phentermine  37.5 mg . Pt is on  last  week of  Metformin 750 mg, what do you want her to do ? To continue or to increase dose ? Thank you     If you are taking an injectable medication,  are you experiencing any of the following symptoms:  Bloating:   Nausea:  Vomiting:   Constipation:   Diarrhea:  SPECIFY INJECTABLE MEDICATION AND CURRENT DOSAGE:      Vitals:    Is BP less than 100/60? no  Is BP greater than 140/90? no  Is HR greater than 100? no  **If yes to any of the above, have patient relax and repeat in 5-10 minutes**    Repeat values:    Is BP less than 100/60?  Is BP greater than 140/90?  Is HR greater than 100?  **If values remain outside of ranges above, please consult provider for next steps**

## 2024-07-08 DIAGNOSIS — E66.9 OBESITY, CLASS I, BMI 30-34.9: ICD-10-CM

## 2024-07-08 RX ORDER — PHENTERMINE HYDROCHLORIDE 37.5 MG/1
37.5 TABLET ORAL DAILY
Qty: 30 TABLET | Refills: 0 | Status: SHIPPED | OUTPATIENT
Start: 2024-07-08

## 2024-07-08 NOTE — TELEPHONE ENCOUNTER
Refill must be reviewed and completed by the office or provider. The refill is unable to be approved or denied by the medication management team.

## 2024-08-06 ENCOUNTER — CLINICAL SUPPORT (OUTPATIENT)
Dept: BARIATRICS | Facility: CLINIC | Age: 39
End: 2024-08-06

## 2024-08-06 VITALS
BODY MASS INDEX: 32.35 KG/M2 | DIASTOLIC BLOOD PRESSURE: 84 MMHG | WEIGHT: 164.8 LBS | SYSTOLIC BLOOD PRESSURE: 118 MMHG | HEIGHT: 60 IN | TEMPERATURE: 97.6 F | HEART RATE: 96 BPM | RESPIRATION RATE: 17 BRPM

## 2024-08-06 DIAGNOSIS — R73.03 PREDIABETES: ICD-10-CM

## 2024-08-06 DIAGNOSIS — R63.5 ABNORMAL WEIGHT GAIN: Primary | ICD-10-CM

## 2024-08-06 DIAGNOSIS — E66.9 OBESITY, CLASS I, BMI 30-34.9: ICD-10-CM

## 2024-08-06 PROCEDURE — RECHECK

## 2024-08-06 RX ORDER — PHENTERMINE HYDROCHLORIDE 37.5 MG/1
37.5 TABLET ORAL DAILY
Qty: 30 TABLET | Refills: 0 | Status: SHIPPED | OUTPATIENT
Start: 2024-08-06 | End: 2024-08-12 | Stop reason: SDUPTHER

## 2024-08-06 RX ORDER — METFORMIN HYDROCHLORIDE 750 MG/1
750 TABLET, EXTENDED RELEASE ORAL
Qty: 90 TABLET | Refills: 0 | Status: SHIPPED | OUTPATIENT
Start: 2024-08-06

## 2024-08-06 RX ORDER — ATORVASTATIN CALCIUM 20 MG/1
TABLET, FILM COATED ORAL
COMMUNITY
Start: 2024-06-29

## 2024-08-06 NOTE — PROGRESS NOTES
Patient last visit weight: 166lbs   Patient current visit weight: 164.8lbs     If you are taking phentermine or other oral weight loss medications, are you experiencing any of the following symptoms:  Headache: NO   Blurred Vision: NO   Chest Pain: NO   Palpitations: NO   Insomnia: NO   SPECIFY ORAL MEDICATION AND DOSAGE: Metformin 750mg x Phentermine 37.5mg. Patient requesting refills of both medications.     If you are taking an injectable medication,  are you experiencing any of the following symptoms:  Bloating:   Nausea:  Vomiting:   Constipation:   Diarrhea:  SPECIFY INJECTABLE MEDICATION AND CURRENT DOSAGE:      Vitals:    Is BP less than 100/60? NO   Is BP greater than 140/90? NO   Is HR greater than 100? NO   **If yes to any of the above, have patient relax and repeat in 5-10 minutes**    Repeat values:    Is BP less than 100/60?  Is BP greater than 140/90?  Is HR greater than 100?  **If values remain outside of ranges above, please consult provider for next steps**

## 2024-08-12 DIAGNOSIS — E66.9 OBESITY, CLASS I, BMI 30-34.9: ICD-10-CM

## 2024-08-13 RX ORDER — PHENTERMINE HYDROCHLORIDE 37.5 MG/1
37.5 TABLET ORAL DAILY
Qty: 30 TABLET | Refills: 0 | Status: SHIPPED | OUTPATIENT
Start: 2024-08-13

## 2024-09-12 DIAGNOSIS — E66.9 OBESITY, CLASS I, BMI 30-34.9: ICD-10-CM

## 2024-09-12 RX ORDER — PHENTERMINE HYDROCHLORIDE 37.5 MG/1
37.5 TABLET ORAL DAILY
Qty: 30 TABLET | Refills: 0 | Status: SHIPPED | OUTPATIENT
Start: 2024-09-12

## 2024-09-12 NOTE — TELEPHONE ENCOUNTER
Reason for call:   [x] Refill   [] Prior Auth  [] Other:     Office:   [] PCP/Provider -   [x] Specialty/Provider - [facility]    Medication: phentermine (ADIPEX-P) 37.5 MG     Dose/Frequency: Take 1 tablet (37.5 mg total) by mouth in the morning     Quantity: 30    Pharmacy: [id number]  [place] [address]     Does the patient have enough for 3 days?   [x] Yes   [] No - Send as HP to POD

## 2024-09-12 NOTE — TELEPHONE ENCOUNTER
08/17/2024 08/13/2024 Phentermine Hcl (Tablet) 30.0 30 37.5 MG NA ALBINO [employer] Commercial [employer] 0 / 0 [place]      1 [id number] 08/07/2024 08/07/2024 traMADol HCL (Tablet) 12.0 3 50 MG 40.0 JOHN MARTINEZ [employer] Commercial [employer] 0 / 0 [place]    1 [id number] 07/08/2024 07/08/2024 Phentermine Hcl (Tablet) 30.0 30 37.5 MG NA ALBINO [employer] Commercial [place] / 0 [place]    1 [id number] 06/04/2024 06/04/2024 Phentermine Hcl (Tablet) 30.0 30 37.5 MG NA ALBINO [employer]

## 2024-10-03 ENCOUNTER — OFFICE VISIT (OUTPATIENT)
Dept: BARIATRICS | Facility: CLINIC | Age: 39
End: 2024-10-03
Payer: COMMERCIAL

## 2024-10-03 VITALS
WEIGHT: 159.2 LBS | DIASTOLIC BLOOD PRESSURE: 82 MMHG | SYSTOLIC BLOOD PRESSURE: 112 MMHG | BODY MASS INDEX: 31.25 KG/M2 | HEART RATE: 101 BPM | HEIGHT: 60 IN | TEMPERATURE: 98.3 F | RESPIRATION RATE: 16 BRPM

## 2024-10-03 DIAGNOSIS — R73.03 PREDIABETES: ICD-10-CM

## 2024-10-03 DIAGNOSIS — E66.811 OBESITY, CLASS I, BMI 30-34.9: Primary | ICD-10-CM

## 2024-10-03 PROCEDURE — 99214 OFFICE O/P EST MOD 30 MIN: CPT | Performed by: PHYSICIAN ASSISTANT

## 2024-10-03 RX ORDER — METFORMIN HYDROCHLORIDE 750 MG/1
750 TABLET, EXTENDED RELEASE ORAL
Qty: 90 TABLET | Refills: 0 | Status: SHIPPED | OUTPATIENT
Start: 2024-10-03

## 2024-10-03 RX ORDER — PHENTERMINE HYDROCHLORIDE 37.5 MG/1
37.5 TABLET ORAL DAILY
Qty: 90 TABLET | Refills: 0 | Status: SHIPPED | OUTPATIENT
Start: 2024-10-03

## 2024-10-03 RX ORDER — PHENTERMINE HYDROCHLORIDE 37.5 MG/1
37.5 TABLET ORAL DAILY
Qty: 30 TABLET | Refills: 0 | Status: SHIPPED | OUTPATIENT
Start: 2024-10-03 | End: 2024-10-03 | Stop reason: SDUPTHER

## 2024-10-03 NOTE — PROGRESS NOTES
Assessment/Plan:    Obesity, Class I, BMI 30-34.9  -Patient is pursuing Conservative Program  -Initial weight loss goal of 5-10% weight loss for improved health  -Screening labs 6/13/24 cmp, a1c    Initial:170.4  Last Visit:164.8  Current:159.2  Change:-11.2 (-5.6lb from last visit)  Goal:    On phentermine .  6.6 % weight loss . Denies any side effects  Continuw on metformin. A1c 6.3.  recheck in December    To continue exercise plan, food logging and water intake        Prediabetes  A1c still elevated. To continue metformin        Return in about 6 months (around 4/3/2025) and for 3 month nurse lisha garcia.       Diagnoses and all orders for this visit:    Obesity, Class I, BMI 30-34.9  -     Discontinue: phentermine (ADIPEX-P) 37.5 MG tablet; Take 1 tablet (37.5 mg total) by mouth in the morning  -     phentermine (ADIPEX-P) 37.5 MG tablet; Take 1 tablet (37.5 mg total) by mouth in the morning    Prediabetes  -     Comprehensive metabolic panel; Future  -     Hemoglobin A1C; Future  -     metFORMIN (GLUCOPHAGE-XR) 750 mg 24 hr tablet; Take 1 tablet (750 mg total) by mouth daily with breakfast          Subjective:   Chief Complaint   Patient presents with    Follow-up     MWM 4M F/u; Waist 31inches        Patient ID: Deidra Marshall  is a 39 y.o. female with excess weight/obesity here to pursue weight managment.  Patient is pursuing Conservative Program.     HPI  On metformin and phentermine. 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  She   Patient Active Problem List    Diagnosis Date Noted    Prediabetes 2024    Obesity, Class I, BMI 30-34.9 2024     She  has a past surgical history that includes  section.  Her family history is not on file.  She  reports that she has never smoked. She has never used smokeless tobacco. She reports that she does not currently use alcohol. She reports that she does not use drugs.  Current Outpatient Medications   Medication Sig Dispense Refill    Ascorbic Acid (ESAU-C PO) Take by mouth      atorvastatin (LIPITOR) 20 mg tablet       B Complex Vitamins (VITAMIN B COMPLEX PO) Take by mouth      medroxyPROGESTERone acetate (DEPO-PROVERA SYRINGE) 150 mg/mL injection       metFORMIN (GLUCOPHAGE-XR) 750 mg 24 hr tablet Take 1 tablet (750 mg total) by mouth daily with breakfast 90 tablet 0    phentermine (ADIPEX-P) 37.5 MG tablet Take 1 tablet (37.5 mg total) by mouth in the morning 90 tablet 0    VITAMIN D, CHOLECALCIFEROL, PO Take by mouth      Methylcobalamin 1 MG CHEW Chew 1 tablet daily (Patient not taking: Reported on 10/3/2024)       No current facility-administered medications for this visit.     Current Outpatient Medications on File Prior to Visit   Medication Sig    Ascorbic Acid (ESAU-C PO) Take by mouth    atorvastatin (LIPITOR) 20 mg tablet     B Complex Vitamins (VITAMIN B COMPLEX PO) Take by mouth    medroxyPROGESTERone acetate (DEPO-PROVERA SYRINGE) 150 mg/mL injection     VITAMIN D, CHOLECALCIFEROL, PO Take by mouth    [DISCONTINUED] metFORMIN (GLUCOPHAGE-XR) 750 mg 24 hr tablet Take 1 tablet (750 mg total) by mouth daily with breakfast    [DISCONTINUED] phentermine (ADIPEX-P) 37.5 MG tablet Take 1 tablet (37.5 mg total) by mouth in the morning    Methylcobalamin 1 MG CHEW Chew 1 tablet daily (Patient not  taking: Reported on 10/3/2024)     No current facility-administered medications on file prior to visit.     She is allergic to oxycodone..    Review of Systems   Constitutional:  Negative for fever.   Respiratory:  Negative for shortness of breath.    Cardiovascular:  Negative for chest pain and palpitations.   Gastrointestinal:  Negative for abdominal pain, constipation, diarrhea and vomiting.   Genitourinary:  Negative for difficulty urinating.   Skin:  Negative for rash.   Neurological:  Negative for headaches.   Psychiatric/Behavioral:  Negative for dysphoric mood. The patient is not nervous/anxious.        Objective:    /82 (BP Location: Left arm, Patient Position: Sitting)   Pulse 101   Temp 98.3 °F (36.8 °C) (Tympanic)   Resp 16   Ht 5' (1.524 m)   Wt 72.2 kg (159 lb 3.2 oz)   BMI 31.09 kg/m²      Physical Exam  Vitals and nursing note reviewed.   Constitutional:       General: She is not in acute distress.     Appearance: She is well-developed. She is obese.   HENT:      Head: Normocephalic and atraumatic.   Eyes:      Conjunctiva/sclera: Conjunctivae normal.   Neck:      Thyroid: No thyromegaly.   Pulmonary:      Effort: Pulmonary effort is normal. No respiratory distress.   Skin:     Findings: No rash (visible).   Neurological:      Mental Status: She is alert and oriented to person, place, and time.   Psychiatric:         Mood and Affect: Mood normal.         Behavior: Behavior normal.

## 2024-10-03 NOTE — ASSESSMENT & PLAN NOTE
-Patient is pursuing Conservative Program  -Initial weight loss goal of 5-10% weight loss for improved health  -Screening labs 6/13/24 cmp, a1c    Initial:170.4  Last Visit:164.8  Current:159.2  Change:-11.2 (-5.6lb from last visit)  Goal:    On phentermine .  6.6 % weight loss . Denies any side effects  Continuw on metformin. A1c 6.3.  recheck in December    To continue exercise plan, food logging and water intake

## 2024-11-05 ENCOUNTER — OFFICE VISIT (OUTPATIENT)
Age: 39
End: 2024-11-05
Payer: COMMERCIAL

## 2024-11-05 VITALS
HEIGHT: 60 IN | TEMPERATURE: 98.6 F | BODY MASS INDEX: 31.22 KG/M2 | SYSTOLIC BLOOD PRESSURE: 108 MMHG | HEART RATE: 103 BPM | WEIGHT: 159 LBS | DIASTOLIC BLOOD PRESSURE: 58 MMHG | RESPIRATION RATE: 18 BRPM

## 2024-11-05 DIAGNOSIS — D48.9 NEOPLASM OF UNCERTAIN BEHAVIOR: Primary | ICD-10-CM

## 2024-11-05 PROCEDURE — 99204 OFFICE O/P NEW MOD 45 MIN: CPT | Performed by: STUDENT IN AN ORGANIZED HEALTH CARE EDUCATION/TRAINING PROGRAM

## 2024-11-05 PROCEDURE — 88305 TISSUE EXAM BY PATHOLOGIST: CPT | Performed by: PATHOLOGY

## 2024-11-05 PROCEDURE — 88341 IMHCHEM/IMCYTCHM EA ADD ANTB: CPT | Performed by: PATHOLOGY

## 2024-11-05 PROCEDURE — 11102 TANGNTL BX SKIN SINGLE LES: CPT | Performed by: STUDENT IN AN ORGANIZED HEALTH CARE EDUCATION/TRAINING PROGRAM

## 2024-11-05 PROCEDURE — 88342 IMHCHEM/IMCYTCHM 1ST ANTB: CPT | Performed by: PATHOLOGY

## 2024-11-05 NOTE — PROGRESS NOTES
"[facility] Dermatology Clinic Note     Patient Name: Deidra Marshall  Encounter Date: 11/5/24     Have you been cared for by a [facility] Dermatologist in the last 3 years and, if so, which description applies to you?    NO.   I am considered a \"new\" patient and must complete all patient intake questions. I am FEMALE/of child-bearing potential.    REVIEW OF SYSTEMS:  Have you recently had or currently have any of the following? Recent fever or chills? No  Any non-healing wound? No  Are you pregnant or planning to become pregnant? No  Are you currently or planning to be nursing or breast feeding? No   PAST MEDICAL HISTORY:  Have you personally ever had or currently have any of the following?  If \"YES,\" then please provide more detail. Skin cancer (such as Melanoma, Basal Cell Carcinoma, Squamous Cell Carcinoma?  No  Tuberculosis, HIV/AIDS, Hepatitis B or C: No  Radiation Treatment No   HISTORY OF IMMUNOSUPPRESSION:   Do you have a history of any of the following:  Systemic Immunosuppression such as Diabetes, Biologic or Immunotherapy, Chemotherapy, Organ Transplantation, Bone Marrow Transplantation or Prednsione?  No    Answering \"YES\" requires the addition of the dotphrase \"IMMUNOSUPPRESSED\" as the first diagnosis of the patient's visit.   FAMILY HISTORY:  Any \"first degree relatives\" (parent, brother, sister, or child) with the following?    Skin Cancer, Pancreatic or Other Cancer? YES, maternal grandmother-breast    PATIENT EXPERIENCE:    Do you want the Dermatologist to perform a COMPLETE skin exam today including a clinical examination under the \"bra and underwear\" areas?  NO  If necessary, do we have your permission to call and leave a detailed message on your Preferred Phone number that includes your specific medical information?  Yes      Allergies   Allergen Reactions    Oxycodone Palpitations      Current Outpatient Medications:     Ascorbic Acid (ESAU-C PO), Take by mouth, Disp: , Rfl:     atorvastatin " "(LIPITOR) 20 mg tablet, , Disp: , Rfl:     B Complex Vitamins (VITAMIN B COMPLEX PO), Take by mouth, Disp: , Rfl:     medroxyPROGESTERone acetate (DEPO-PROVERA SYRINGE) 150 mg/mL injection, , Disp: , Rfl:     metFORMIN (GLUCOPHAGE-XR) 750 mg 24 hr tablet, Take 1 tablet (750 mg total) by mouth daily with breakfast, Disp: 90 tablet, Rfl: 0    Methylcobalamin 1 MG CHEW, Chew 1 tablet daily (Patient not taking: Reported on 10/3/2024), Disp: , Rfl:     phentermine (ADIPEX-P) 37.5 MG tablet, Take 1 tablet (37.5 mg total) by mouth in the morning, Disp: 90 tablet, Rfl: 0    VITAMIN D, CHOLECALCIFEROL, PO, Take by mouth, Disp: , Rfl:           Whom besides the patient is providing clinical information about today's encounter?   NO ADDITIONAL HISTORIAN (patient alone provided history)    Physical Exam and Assessment/Plan by Diagnosis:    NEOPLASM OF UNCERTAIN BEHAVIOR OF SKIN    Physical Exam:  (Anatomic Location); (Size and Morphological Description); (Differential Diagnosis):  Specimen A: right jaw line; 0.2 cm skin colored pedunculated papule; Diff Dx: dermal nevus VS skin tag, r/o atypia    Pertinent Positives:  Pertinent Negatives:    Additional History of Present Condition:  present for about 4 years. Patient thinks its getting bigger and states its becoming more noticeable to others    Assessment and Plan:  I have discussed with the patient that a sample of skin via a \"skin biopsy” would be potentially helpful to further make a specific diagnosis under the microscope.  Based on a thorough discussion of this condition and the management approach to it (including a comprehensive discussion of the known risks, side effects and potential benefits of treatment), the patient (family) agrees to implement the following specific plan:    Procedure:  Skin Biopsy.  After a thorough discussion of treatment options and risk/benefits/alternatives (including but not limited to local pain, scarring, dyspigmentation, blistering, " possible superinfection, and inability to confirm a diagnosis via histopathology), verbal and written consent were obtained and portion of the rash was biopsied for tissue sample.  See below for consent that was obtained from patient and subsequent Procedure Note.     PROCEDURE TANGENTIAL (SHAVE) BIOPSY NOTE:    Performing Physician:   Anatomic Location; Clinical Description with size (cm); Pre-Op Diagnosis:   Specimen A: right jaw line; 0.2 cm skin colored pedunculated papule; Diff Dx: dermal nevus VS skin tag, r/o atypia    Post-op diagnosis: Same     Local anesthesia: 1% xylocaine with epi      Topical anesthesia: None    Hemostasis: Aluminum chloride       After obtaining informed consent  at which time there was a discussion about the purpose of biopsy  and low risks of infection and bleeding.  The area was prepped and draped in the usual fashion. Anesthesia was obtained with 1% lidocaine with epinephrine. A shave biopsy to an appropriate sampling depth was obtained by Shave (Dermablade or 15 blade) The resulting wound was covered with surgical ointment and bandaged appropriately.     The patient tolerated the procedure well without complications and was without signs of functional compromise.      Specimen has been sent for review by Dermatopathology.    Standard post-procedure care has been explained and has been included in written form within the patient's copy of Informed Consent.    INFORMED CONSENT DISCUSSION AND POST-OPERATIVE INSTRUCTIONS FOR PATIENT    I.  RATIONALE FOR PROCEDURE  I understand that a skin biopsy allows the Dermatologist to test a lesion or rash under the microscope to obtain a diagnosis.  It usually involves numbing the area with numbing medication and removing a small piece of skin; sometimes the area will be closed with sutures. In this specific procedure, sutures are not usually needed.  If any sutures are placed, then they are usually need to be removed in 2 weeks or  "less.    I understand that my Dermatologist recommends that a skin \"shave\" biopsy be performed today.  A local anesthetic, similar to the kind that a dentist uses when filling a cavity, will be injected with a very small needle into the skin area to be sampled.  The injected skin and tissue underneath \"will go to sleep” and become numb so no pain should be felt afterwards.  An instrument shaped like a tiny \"razor blade\" (shave biopsy instrument) will be used to cut a small piece of tissue and skin from the area so that a sample of tissue can be taken and examined more closely under the microscope.  A slight amount of bleeding will occur, but it will be stopped with direct pressure and a pressure bandage and any other appropriate methods.  I understands that a scar will form where the wound was created.  Surgical ointment will be applied to help protect the wound.  Sutures are not usually needed.    II.  RISKS AND POTENTIAL COMPLICATIONS   I understand the risks and potential complications of a skin biopsy include but are not limited to the following:  Bleeding  Infection  Pain  Scar/keloid  Skin discoloration  Incomplete Removal  Recurrence  Nerve Damage/Numbness/Loss of Function  Allergic Reaction to Anesthesia  Biopsies are diagnostic procedures and based on findings additional treatment or evaluation may be required  Loss or destruction of specimen resulting in no additional findings    My Dermatologist has explained to me the nature of the condition, the nature of the procedure, and the benefits to be reasonably expected compared with alternative approaches.  My Dermatologist has discussed the likelihood of major risks or complications of this procedure including the specific risks listed above, such as bleeding, infection, and scarring/keloid.  I understand that a scar is expected after this procedure.  I understand that my physician cannot predict if the scar will form a \"keloid,\" which extends beyond the " "borders of the wound that is created.  A keloid is a thick, painful, and bumpy scar.  A keloid can be difficult to treat, as it does not always respond well to therapy, which includes injecting cortisone directly into the keloid every few weeks.  While this usually reduces the pain and size of the scar, it does not eliminate it.      I understand that photographs may be taken before and after the procedure.  These will be maintained as part of the medical providers confidential records and may not be made available to me.  I further authorize the medical provider to use the photographs for teaching purposes or to illustrate scientific papers, books, or lectures if in his/her judgment, medical research, education, or science may benefit from its use.    I have had an opportunity to fully inquire about the risks and benefits of this procedure and its alternatives.   I have been given ample time and opportunity to ask questions and to seek a second opinion if I wished to do so.  I acknowledge that there have specifically been no guarantees as to the cosmetic results from the procedure.  I am aware that with any procedure there is always the possibility of an unexpected complication.    III. POST-PROCEDURAL CARE (WHAT YOU WILL NEED TO DO \"AFTER THE BIOPSY\" TO OPTIMIZE HEALING)    Keep the area clean and dry.  Try NOT to remove the bandage or get it wet for the first 24 hours.    Gently clean the area and apply surgical ointment (such as Vaseline petrolatum ointment, which is available \"over the counter\" and not a prescription) to the biopsy site for up to 2 weeks straight.  This acts to protect the wound from the outside world.      Sutures are not usually placed in this procedure.  If any sutures were placed, return for suture removal as instructed (generally 1 week for the face, 2 weeks for the body).      Take Acetaminophen (Tylenol) for discomfort, if no contraindications.  Ibuprofen or aspirin could make bleeding " worse.    Call our office immediately for signs of infection: fever, chills, increased redness, warmth, tenderness, discomfort/pain, or pus or foul smell coming from the wound.    WHAT TO DO IF THERE IS ANY BLEEDING?  If a small amount of bleeding is noticed, place a clean cloth over the area and apply firm pressure for ten minutes.  Check the wound after 10 minutes of direct pressure.  If bleeding persists, try one more time for an additional 10 minutes of direct pressure on the area.  If the bleeding becomes heavier or does not stop after the second attempt, or if you have any other questions about this procedure, then please call your [facility] Dermatologist by calling [phone] (SKIN).     I hereby acknowledge that I have reviewed and verified the site with my Dermatologist and have requested and authorized my Dermatologist to proceed with the procedure.           Scribe Attestation      I,:  Mallory Malin MA am acting as a scribe while in the presence of the attending physician.:       I,:  Girish Galvan DO personally performed the services described in this documentation    as scribed in my presence.:

## 2024-11-05 NOTE — PATIENT INSTRUCTIONS
"NEOPLASM OF UNCERTAIN BEHAVIOR OF SKIN      Assessment and Plan:  I have discussed with the patient that a sample of skin via a \"skin biopsy” would be potentially helpful to further make a specific diagnosis under the microscope.  Based on a thorough discussion of this condition and the management approach to it (including a comprehensive discussion of the known risks, side effects and potential benefits of treatment), the patient (family) agrees to implement the following specific plan:    Procedure:  Skin Biopsy.  After a thorough discussion of treatment options and risk/benefits/alternatives (including but not limited to local pain, scarring, dyspigmentation, blistering, possible superinfection, and inability to confirm a diagnosis via histopathology), verbal and written consent were obtained and portion of the rash was biopsied for tissue sample.  See below for consent that was obtained from patient and subsequent Procedure Note.     PROCEDURE TANGENTIAL (SHAVE) BIOPSY NOTE:    After obtaining informed consent  at which time there was a discussion about the purpose of biopsy  and low risks of infection and bleeding.  The area was prepped and draped in the usual fashion. Anesthesia was obtained with 1% lidocaine with epinephrine. A shave biopsy to an appropriate sampling depth was obtained by Shave (Dermablade or 15 blade) The resulting wound was covered with surgical ointment and bandaged appropriately.     The patient tolerated the procedure well without complications and was without signs of functional compromise.      Specimen has been sent for review by Dermatopathology.    Standard post-procedure care has been explained and has been included in written form within the patient's copy of Informed Consent.    INFORMED CONSENT DISCUSSION AND POST-OPERATIVE INSTRUCTIONS FOR PATIENT    I.  RATIONALE FOR PROCEDURE  I understand that a skin biopsy allows the Dermatologist to test a lesion or rash under the " "microscope to obtain a diagnosis.  It usually involves numbing the area with numbing medication and removing a small piece of skin; sometimes the area will be closed with sutures. In this specific procedure, sutures are not usually needed.  If any sutures are placed, then they are usually need to be removed in 2 weeks or less.    I understand that my Dermatologist recommends that a skin \"shave\" biopsy be performed today.  A local anesthetic, similar to the kind that a dentist uses when filling a cavity, will be injected with a very small needle into the skin area to be sampled.  The injected skin and tissue underneath \"will go to sleep” and become numb so no pain should be felt afterwards.  An instrument shaped like a tiny \"razor blade\" (shave biopsy instrument) will be used to cut a small piece of tissue and skin from the area so that a sample of tissue can be taken and examined more closely under the microscope.  A slight amount of bleeding will occur, but it will be stopped with direct pressure and a pressure bandage and any other appropriate methods.  I understands that a scar will form where the wound was created.  Surgical ointment will be applied to help protect the wound.  Sutures are not usually needed.    II.  RISKS AND POTENTIAL COMPLICATIONS   I understand the risks and potential complications of a skin biopsy include but are not limited to the following:  Bleeding  Infection  Pain  Scar/keloid  Skin discoloration  Incomplete Removal  Recurrence  Nerve Damage/Numbness/Loss of Function  Allergic Reaction to Anesthesia  Biopsies are diagnostic procedures and based on findings additional treatment or evaluation may be required  Loss or destruction of specimen resulting in no additional findings    My Dermatologist has explained to me the nature of the condition, the nature of the procedure, and the benefits to be reasonably expected compared with alternative approaches.  My Dermatologist has discussed the " "likelihood of major risks or complications of this procedure including the specific risks listed above, such as bleeding, infection, and scarring/keloid.  I understand that a scar is expected after this procedure.  I understand that my physician cannot predict if the scar will form a \"keloid,\" which extends beyond the borders of the wound that is created.  A keloid is a thick, painful, and bumpy scar.  A keloid can be difficult to treat, as it does not always respond well to therapy, which includes injecting cortisone directly into the keloid every few weeks.  While this usually reduces the pain and size of the scar, it does not eliminate it.      I understand that photographs may be taken before and after the procedure.  These will be maintained as part of the medical providers confidential records and may not be made available to me.  I further authorize the medical provider to use the photographs for teaching purposes or to illustrate scientific papers, books, or lectures if in his/her judgment, medical research, education, or science may benefit from its use.    I have had an opportunity to fully inquire about the risks and benefits of this procedure and its alternatives.   I have been given ample time and opportunity to ask questions and to seek a second opinion if I wished to do so.  I acknowledge that there have specifically been no guarantees as to the cosmetic results from the procedure.  I am aware that with any procedure there is always the possibility of an unexpected complication.    III. POST-PROCEDURAL CARE (WHAT YOU WILL NEED TO DO \"AFTER THE BIOPSY\" TO OPTIMIZE HEALING)    Keep the area clean and dry.  Try NOT to remove the bandage or get it wet for the first 24 hours.    Gently clean the area and apply surgical ointment (such as Vaseline petrolatum ointment, which is available \"over the counter\" and not a prescription) to the biopsy site for up to 2 weeks straight.  This acts to protect the wound " from the outside world.      Sutures are not usually placed in this procedure.  If any sutures were placed, return for suture removal as instructed (generally 1 week for the face, 2 weeks for the body).      Take Acetaminophen (Tylenol) for discomfort, if no contraindications.  Ibuprofen or aspirin could make bleeding worse.    Call our office immediately for signs of infection: fever, chills, increased redness, warmth, tenderness, discomfort/pain, or pus or foul smell coming from the wound.    WHAT TO DO IF THERE IS ANY BLEEDING?  If a small amount of bleeding is noticed, place a clean cloth over the area and apply firm pressure for ten minutes.  Check the wound after 10 minutes of direct pressure.  If bleeding persists, try one more time for an additional 10 minutes of direct pressure on the area.  If the bleeding becomes heavier or does not stop after the second attempt, or if you have any other questions about this procedure, then please call your [facility]'s Dermatologist by calling [phone] (SKIN).     I hereby acknowledge that I have reviewed and verified the site with my Dermatologist and have requested and authorized my Dermatologist to proceed with the procedure.

## 2024-11-11 PROCEDURE — 88341 IMHCHEM/IMCYTCHM EA ADD ANTB: CPT | Performed by: PATHOLOGY

## 2024-11-11 PROCEDURE — 88342 IMHCHEM/IMCYTCHM 1ST ANTB: CPT | Performed by: PATHOLOGY

## 2024-11-11 PROCEDURE — 88305 TISSUE EXAM BY PATHOLOGIST: CPT | Performed by: PATHOLOGY

## 2024-12-03 ENCOUNTER — CLINICAL SUPPORT (OUTPATIENT)
Dept: BARIATRICS | Facility: CLINIC | Age: 39
End: 2024-12-03

## 2024-12-03 VITALS
DIASTOLIC BLOOD PRESSURE: 76 MMHG | HEART RATE: 96 BPM | BODY MASS INDEX: 32.24 KG/M2 | SYSTOLIC BLOOD PRESSURE: 110 MMHG | WEIGHT: 164.2 LBS | RESPIRATION RATE: 16 BRPM | HEIGHT: 60 IN

## 2024-12-03 DIAGNOSIS — E66.812 OBESITY, CLASS II, BMI 35-39.9: Primary | ICD-10-CM

## 2024-12-03 PROCEDURE — RECHECK

## 2024-12-05 DIAGNOSIS — R73.03 PREDIABETES: ICD-10-CM

## 2024-12-06 RX ORDER — METFORMIN HYDROCHLORIDE 750 MG/1
TABLET, EXTENDED RELEASE ORAL
Qty: 60 TABLET | Refills: 5 | Status: SHIPPED | OUTPATIENT
Start: 2024-12-06

## 2025-01-21 DIAGNOSIS — E66.811 OBESITY, CLASS I, BMI 30-34.9: ICD-10-CM

## 2025-01-22 NOTE — TELEPHONE ENCOUNTER
Refill must be reviewed and completed by the office or provider. The refill is unable to be approved or denied by the medication management team.      Patient Id Prescription # Filled Written Drug Label Qty Days Strength MME** Prescriber Pharmacy Payment REFILL #/Auth State Detail   1 [id number] 11/05/2024 10/03/2024 Phentermine Hcl (Tablet) 90.0 90 37.5 MG NA RICHCYNTHIA [email or web address] Private Pay 0 / 0 [place]    1 [id number] 09/24/2024 09/12/2024 Phentermine Hcl (Tablet) 30.0 30 37.5 MG NA SACHI, PICA [employer] Private Pay 0 / 0 [place]    1 [id number] 08/17/2024 08/13/2024 Phentermine Hcl (Tablet) 30.0 30 37.5 MG NA ALBINO MACDONALD [employer] Commercial Insurance 0 / 0 [place]    1 [id number] 08/07/2024 08/07/2024 traMADol HCL (Tablet) 12.0 3 50 MG 40.0 JOHN MARTINEZ [employer] Commercial Insurance 0 / 0 [place]

## 2025-01-22 NOTE — TELEPHONE ENCOUNTER
Refill must be reviewed and completed by the office or provider. The refill is unable to be approved or denied by the medication management team.          Patient Id Prescription # Filled Written Drug Label Qty Days Strength MME** Prescriber Pharmacy Payment REFILL #/Auth State Detail   1 [id number] 11/05/2024 10/03/2024 Phentermine Hcl (Tablet) 90.0 90 37.5 MG NA RICHCYNTHIA [email or web address] Private Pay 0 / 0 [place]    1 [id number] 09/24/2024 09/12/2024 Phentermine Hcl (Tablet) 30.0 30 37.5 MG NA SACHI, P[employer] Private Pay 0 / 0 [place]    1 [id number] 08/17/2024 08/13/2024 Phentermine Hcl (Tablet) 30.0 30 37.5 MG NA ALBINO MACDONALD [employer] Commercial Insurance 0 / 0 [place]    1 [id number] 08/07/2024 08/07/2024 traMADol HCL (Tablet) 12.0 3 50 MG 40.0 JOHN MARTINEZ [employer] Commercial Insurance 0 / 0 [place]

## 2025-01-23 RX ORDER — PHENTERMINE HYDROCHLORIDE 37.5 MG/1
TABLET ORAL
Qty: 90 TABLET | Refills: 0 | Status: SHIPPED | OUTPATIENT
Start: 2025-01-23

## 2025-01-23 RX ORDER — PHENTERMINE HYDROCHLORIDE 37.5 MG/1
37.5 TABLET ORAL DAILY
Qty: 90 TABLET | Refills: 0 | OUTPATIENT
Start: 2025-01-23

## 2025-03-03 ENCOUNTER — CLINICAL SUPPORT (OUTPATIENT)
Dept: BARIATRICS | Facility: CLINIC | Age: 40
End: 2025-03-03

## 2025-03-03 VITALS
RESPIRATION RATE: 16 BRPM | WEIGHT: 165.2 LBS | BODY MASS INDEX: 32.43 KG/M2 | SYSTOLIC BLOOD PRESSURE: 118 MMHG | HEART RATE: 103 BPM | DIASTOLIC BLOOD PRESSURE: 72 MMHG | HEIGHT: 60 IN

## 2025-03-03 DIAGNOSIS — E66.812 OBESITY, CLASS II, BMI 35-39.9: Primary | ICD-10-CM

## 2025-03-03 PROCEDURE — RECHECK

## 2025-04-07 ENCOUNTER — OFFICE VISIT (OUTPATIENT)
Dept: BARIATRICS | Facility: CLINIC | Age: 40
End: 2025-04-07
Payer: COMMERCIAL

## 2025-04-07 VITALS
SYSTOLIC BLOOD PRESSURE: 125 MMHG | HEART RATE: 104 BPM | TEMPERATURE: 96.6 F | DIASTOLIC BLOOD PRESSURE: 84 MMHG | BODY MASS INDEX: 32.28 KG/M2 | HEIGHT: 60 IN | RESPIRATION RATE: 16 BRPM | WEIGHT: 164.4 LBS

## 2025-04-07 DIAGNOSIS — R73.03 PREDIABETES: ICD-10-CM

## 2025-04-07 DIAGNOSIS — E66.811 OBESITY, CLASS I, BMI 30-34.9: Primary | ICD-10-CM

## 2025-04-07 DIAGNOSIS — E78.5 HLD (HYPERLIPIDEMIA): ICD-10-CM

## 2025-04-07 PROCEDURE — 99214 OFFICE O/P EST MOD 30 MIN: CPT | Performed by: PHYSICIAN ASSISTANT

## 2025-04-07 RX ORDER — TIRZEPATIDE 2.5 MG/.5ML
2.5 INJECTION, SOLUTION SUBCUTANEOUS WEEKLY
Qty: 2 ML | Refills: 0 | Status: SHIPPED | OUTPATIENT
Start: 2025-04-07 | End: 2025-05-05

## 2025-04-07 NOTE — PROGRESS NOTES
Assessment/Plan:    Obesity, Class I, BMI 30-34.9  -Patient is pursuing Conservative Program  -Initial weight loss goal of 5-10% weight loss for improved health  -Screening labs 6/13/24 cmp, a1c    Initial:170.4  Last Visit:159.2  Current:164.4  Change:--6lb     On phentermine and has started to gain some weight back.  To start on zepbound. To start on initial dose of medication and then to titrate as tolerated.  They have tried more than 6 months of lifestyle modifications including diet and activity changes and has had insignificant weight loss of less than 1 lb a week. Patient denies personal and family history of MCT and MEN2 tumors. Patient denies personal history of pancreatitis. Side effects discussed but not limited to diarrhea, bloating, constipation, GI upset, heartburn, increased heart rate, headache, low blood sugar, fatigue and dizziness. Titration and medication administration discussed  Continuw on metformin. A1c 6.3.  recheck in December      To continue exercise plan, food logging and water intake  Discussed meal prepping to help with weight loss.     Prediabetes  To continue on metformin.      HLD (hyperlipidemia)  On atorvastatin  -should improve with weight loss, dietary, and lifestyle changes          Return in about 3 months (around 7/7/2025).     Diagnoses and all orders for this visit:    Obesity, Class I, BMI 30-34.9  -     tirzepatide (Zepbound) 2.5 mg/0.5 mL auto-injector; Inject 0.5 mL (2.5 mg total) under the skin once a week for 28 days    Prediabetes  -     tirzepatide (Zepbound) 2.5 mg/0.5 mL auto-injector; Inject 0.5 mL (2.5 mg total) under the skin once a week for 28 days    HLD (hyperlipidemia)          Subjective:   Chief Complaint   Patient presents with    Follow-up     MWM-6M F/u; Waist-33in        Patient ID: Deidra Marshall  is a 39 y.o. female with excess weight/obesity here to pursue weight managment.  Patient is pursuing Conservative Program.     HPI  She was on  phentermine but did not feel like it was effective. She is sometimes making more impulsive decisions with foods .  When she takes it she will not be hungry and then will get more hungry at night. She is doing WW and exercising but not losing.     Wt Readings from Last 10 Encounters:   25 74.6 kg (164 lb 6.4 oz)   25 74.9 kg (165 lb 3.2 oz)   24 74.5 kg (164 lb 3.2 oz)   24 72.1 kg (159 lb)   10/03/24 72.2 kg (159 lb 3.2 oz)   24 74.8 kg (164 lb 12.8 oz)   24 75.5 kg (166 lb 6.4 oz)   24 77.3 kg (170 lb 6.4 oz)   20 69.2 kg (152 lb 8.9 oz)   19 70.8 kg (156 lb)       Food logging: yes-WW , 1390   Increased appetite/cravings:  Exercise3 x 30 minute beach [place], 1.5 hours strength training 2 x week, 8-13,000 steps a day  Hydration:trying to get 64 oz water, coffee , protein shake 1-2 isopure w/[employer] or premier protein    Diet Recall:  B: yogurt and egg  L:salmon w/rice and vegetable or salad/sandwich  D:varies more      The following portions of the patient's history were reviewed and updated as appropriate: She  has a past medical history of Hyperlipidemia.  She   Patient Active Problem List    Diagnosis Date Noted    HLD (hyperlipidemia) 2025    Prediabetes 2024    Obesity, Class I, BMI 30-34.9 2024     She  has a past surgical history that includes  section.  Her family history is not on file.  She  reports that she has never smoked. She has never used smokeless tobacco. She reports that she does not currently use alcohol. She reports that she does not use drugs.  Current Outpatient Medications   Medication Sig Dispense Refill    Ascorbic Acid (ESAU-C PO) Take by mouth      atorvastatin (LIPITOR) 20 mg tablet       B Complex Vitamins (VITAMIN B COMPLEX PO) Take by mouth      medroxyPROGESTERone acetate (DEPO-PROVERA SYRINGE) 150 mg/mL injection       metFORMIN (GLUCOPHAGE-XR) 750 mg 24 hr tablet TAKE 1 TABLET BY MOUTH DAILY FOR 14 DAYS  THEN TAKE 2 TABLETS BY MOUTH DAILY WITH FOOD 60 tablet 5    phentermine (ADIPEX-P) 37.5 MG tablet TAKE 1 TABLET(37.5 MG) BY MOUTH IN THE MORNING 90 tablet 0    tirzepatide (Zepbound) 2.5 mg/0.5 mL auto-injector Inject 0.5 mL (2.5 mg total) under the skin once a week for 28 days 2 mL 0    VITAMIN D, CHOLECALCIFEROL, PO Take by mouth      Methylcobalamin 1 MG CHEW Chew 1 tablet daily (Patient not taking: Reported on 4/7/2025)       No current facility-administered medications for this visit.     Current Outpatient Medications on File Prior to Visit   Medication Sig    Ascorbic Acid (ESAU-C PO) Take by mouth    atorvastatin (LIPITOR) 20 mg tablet     B Complex Vitamins (VITAMIN B COMPLEX PO) Take by mouth    medroxyPROGESTERone acetate (DEPO-PROVERA SYRINGE) 150 mg/mL injection     metFORMIN (GLUCOPHAGE-XR) 750 mg 24 hr tablet TAKE 1 TABLET BY MOUTH DAILY FOR 14 DAYS THEN TAKE 2 TABLETS BY MOUTH DAILY WITH FOOD    phentermine (ADIPEX-P) 37.5 MG tablet TAKE 1 TABLET(37.5 MG) BY MOUTH IN THE MORNING    VITAMIN D, CHOLECALCIFEROL, PO Take by mouth    Methylcobalamin 1 MG CHEW Chew 1 tablet daily (Patient not taking: Reported on 4/7/2025)     No current facility-administered medications on file prior to visit.     She is allergic to oxycodone..    Review of Systems   Constitutional:  Negative for fever.   Respiratory:  Negative for shortness of breath.    Cardiovascular:  Negative for chest pain and palpitations.   Gastrointestinal:  Negative for abdominal pain, constipation, diarrhea and vomiting.   Genitourinary:  Negative for difficulty urinating.   Musculoskeletal:  Negative for arthralgias and back pain.   Skin:  Negative for rash.   Neurological:  Negative for headaches.   Psychiatric/Behavioral:  Negative for dysphoric mood. The patient is not nervous/anxious.        Objective:    /84   Pulse 104   Temp (!) 96.6 °F (35.9 °C)   Resp 16   Ht 5' (1.524 m)   Wt 74.6 kg (164 lb 6.4 oz)   BMI 32.11 kg/m²       Physical Exam  Vitals and nursing note reviewed.   Constitutional:       General: She is not in acute distress.     Appearance: She is well-developed. She is obese.   HENT:      Head: Normocephalic and atraumatic.   Eyes:      Conjunctiva/sclera: Conjunctivae normal.   Neck:      Thyroid: No thyromegaly.   Pulmonary:      Effort: Pulmonary effort is normal. No respiratory distress.   Skin:     Findings: No rash (visible).   Neurological:      Mental Status: She is alert and oriented to person, place, and time.   Psychiatric:         Behavior: Behavior normal.

## 2025-04-07 NOTE — ASSESSMENT & PLAN NOTE
-Patient is pursuing Conservative Program  -Initial weight loss goal of 5-10% weight loss for improved health  -Screening labs 6/13/24 cmp, a1c    Initial:170.4  Last Visit:159.2  Current:164.4  Change:--6lb     On phentermine and has started to gain some weight back.  To start on zepbound. To start on initial dose of medication and then to titrate as tolerated.  They have tried more than 6 months of lifestyle modifications including diet and activity changes and has had insignificant weight loss of less than 1 lb a week. Patient denies personal and family history of MCT and MEN2 tumors. Patient denies personal history of pancreatitis. Side effects discussed but not limited to diarrhea, bloating, constipation, GI upset, heartburn, increased heart rate, headache, low blood sugar, fatigue and dizziness. Titration and medication administration discussed  Continuw on metformin. A1c 6.3.  recheck in December      To continue exercise plan, food logging and water intake  Discussed meal prepping to help with weight loss.

## 2025-04-11 ENCOUNTER — TELEPHONE (OUTPATIENT)
Dept: BARIATRICS | Facility: CLINIC | Age: 40
End: 2025-04-11

## 2025-04-11 NOTE — TELEPHONE ENCOUNTER
PA for Zepbound 2.5mg SUBMITTED to [employer]     via    [x]CMM-KEY: [id number]  []Surescripts-Case ID #   []Availity-Auth ID # NDC #   []Faxed to plan   []Other website   []Phone call Case ID #     []PA sent as URGENT    All office notes, labs and other pertaining documents and studies sent. Clinical questions answered. Awaiting determination from insurance company.     Turnaround time for your insurance to make a decision on your Prior Authorization can take 7-21 business days.

## 2025-04-11 NOTE — TELEPHONE ENCOUNTER
PA for Zepbound EXCLUDED from plan       Reason:(Screenshot if applicable)    Drug not covered by plan    Message sent to office clinical pool Yes

## 2025-04-14 DIAGNOSIS — R73.03 PREDIABETES: Primary | ICD-10-CM

## 2025-04-14 DIAGNOSIS — E78.5 HLD (HYPERLIPIDEMIA): ICD-10-CM

## 2025-04-14 DIAGNOSIS — E66.811 OBESITY, CLASS I, BMI 30-34.9: ICD-10-CM

## 2025-04-14 RX ORDER — TIRZEPATIDE 2.5 MG/.5ML
2.5 INJECTION, SOLUTION SUBCUTANEOUS WEEKLY
Qty: 2 ML | Refills: 0 | Status: SHIPPED | OUTPATIENT
Start: 2025-04-14

## 2025-04-22 DIAGNOSIS — R73.03 PREDIABETES: ICD-10-CM

## 2025-04-22 RX ORDER — METFORMIN HYDROCHLORIDE 750 MG/1
1500 TABLET, EXTENDED RELEASE ORAL DAILY
Qty: 180 TABLET | Refills: 1 | Status: SHIPPED | OUTPATIENT
Start: 2025-04-22 | End: 2025-05-02

## 2025-04-30 DIAGNOSIS — E66.811 OBESITY, CLASS I, BMI 30-34.9: ICD-10-CM

## 2025-04-30 DIAGNOSIS — E78.5 HLD (HYPERLIPIDEMIA): ICD-10-CM

## 2025-04-30 DIAGNOSIS — R73.03 PREDIABETES: ICD-10-CM

## 2025-05-01 RX ORDER — TIRZEPATIDE 2.5 MG/.5ML
INJECTION, SOLUTION SUBCUTANEOUS
Qty: 2 ML | Refills: 0 | Status: SHIPPED | OUTPATIENT
Start: 2025-05-01

## 2025-05-02 DIAGNOSIS — R73.03 PREDIABETES: ICD-10-CM

## 2025-05-02 RX ORDER — METFORMIN HYDROCHLORIDE 750 MG/1
TABLET, EXTENDED RELEASE ORAL
Qty: 90 TABLET | Refills: 1 | Status: SHIPPED | OUTPATIENT
Start: 2025-05-02

## 2025-05-20 DIAGNOSIS — E66.811 OBESITY, CLASS I, BMI 30-34.9: ICD-10-CM

## 2025-05-20 DIAGNOSIS — E78.5 HLD (HYPERLIPIDEMIA): ICD-10-CM

## 2025-05-20 DIAGNOSIS — R73.03 PREDIABETES: ICD-10-CM

## 2025-05-22 DIAGNOSIS — E66.811 OBESITY, CLASS I, BMI 30-34.9: Primary | ICD-10-CM

## 2025-05-22 DIAGNOSIS — E78.5 HLD (HYPERLIPIDEMIA): ICD-10-CM

## 2025-05-22 DIAGNOSIS — R73.03 PREDIABETES: ICD-10-CM

## 2025-05-22 RX ORDER — TIRZEPATIDE 2.5 MG/.5ML
INJECTION, SOLUTION SUBCUTANEOUS
Qty: 2 ML | Refills: 1 | Status: SHIPPED | OUTPATIENT
Start: 2025-05-22 | End: 2025-05-22

## 2025-05-22 RX ORDER — TIRZEPATIDE 5 MG/.5ML
5 INJECTION, SOLUTION SUBCUTANEOUS WEEKLY
Qty: 2 ML | Refills: 1 | Status: SHIPPED | OUTPATIENT
Start: 2025-05-22

## 2025-05-22 RX ORDER — TIRZEPATIDE 5 MG/.5ML
5 INJECTION, SOLUTION SUBCUTANEOUS WEEKLY
Qty: 2 ML | Refills: 1 | Status: SHIPPED | OUTPATIENT
Start: 2025-05-22 | End: 2025-05-22

## 2025-05-27 DIAGNOSIS — R73.03 PREDIABETES: ICD-10-CM

## 2025-05-27 DIAGNOSIS — E78.5 HLD (HYPERLIPIDEMIA): ICD-10-CM

## 2025-05-27 DIAGNOSIS — E66.811 OBESITY, CLASS I, BMI 30-34.9: Primary | ICD-10-CM

## 2025-05-27 RX ORDER — TIRZEPATIDE 2.5 MG/.5ML
2.5 INJECTION, SOLUTION SUBCUTANEOUS WEEKLY
Qty: 2 ML | Refills: 1 | Status: SHIPPED | OUTPATIENT
Start: 2025-05-27

## 2025-06-19 DIAGNOSIS — R73.03 PREDIABETES: Primary | ICD-10-CM

## 2025-06-19 DIAGNOSIS — E66.811 OBESITY, CLASS I, BMI 30-34.9: ICD-10-CM

## 2025-06-19 DIAGNOSIS — E78.5 HLD (HYPERLIPIDEMIA): ICD-10-CM

## 2025-06-19 RX ORDER — TIRZEPATIDE 5 MG/.5ML
5 INJECTION, SOLUTION SUBCUTANEOUS WEEKLY
Qty: 2 ML | Refills: 1 | Status: SHIPPED | OUTPATIENT
Start: 2025-06-19

## 2025-07-31 ENCOUNTER — OFFICE VISIT (OUTPATIENT)
Dept: BARIATRICS | Facility: CLINIC | Age: 40
End: 2025-07-31
Payer: COMMERCIAL

## 2025-07-31 VITALS
WEIGHT: 156 LBS | SYSTOLIC BLOOD PRESSURE: 114 MMHG | RESPIRATION RATE: 16 BRPM | TEMPERATURE: 97.6 F | HEART RATE: 88 BPM | DIASTOLIC BLOOD PRESSURE: 80 MMHG | BODY MASS INDEX: 30.63 KG/M2 | HEIGHT: 60 IN

## 2025-07-31 DIAGNOSIS — R73.03 PREDIABETES: ICD-10-CM

## 2025-07-31 DIAGNOSIS — E66.811 OBESITY, CLASS I, BMI 30-34.9: Primary | ICD-10-CM

## 2025-07-31 DIAGNOSIS — E78.5 HLD (HYPERLIPIDEMIA): ICD-10-CM

## 2025-07-31 PROCEDURE — 99214 OFFICE O/P EST MOD 30 MIN: CPT | Performed by: PHYSICIAN ASSISTANT

## 2025-07-31 RX ORDER — BUPROPION HYDROCHLORIDE 150 MG/1
TABLET ORAL
COMMUNITY
Start: 2025-07-29

## 2025-07-31 RX ORDER — TIRZEPATIDE 5 MG/.5ML
5 INJECTION, SOLUTION SUBCUTANEOUS WEEKLY
Qty: 2 ML | Refills: 3 | Status: SHIPPED | OUTPATIENT
Start: 2025-07-31